# Patient Record
Sex: MALE | Race: WHITE | Employment: OTHER | URBAN - METROPOLITAN AREA
[De-identification: names, ages, dates, MRNs, and addresses within clinical notes are randomized per-mention and may not be internally consistent; named-entity substitution may affect disease eponyms.]

---

## 2017-06-02 PROBLEM — C44.91 SKIN CANCER, BASAL CELL: Status: ACTIVE | Noted: 2017-06-02

## 2017-06-15 ENCOUNTER — HOSPITAL ENCOUNTER (OUTPATIENT)
Dept: CT IMAGING | Age: 77
Discharge: HOME | End: 2017-06-15
Attending: FAMILY MEDICINE
Payer: MEDICARE

## 2017-06-15 DIAGNOSIS — R91.1 PULMONARY NODULE, RIGHT: ICD-10-CM

## 2017-06-15 PROCEDURE — 71250 CT THORAX DX C-: CPT

## 2019-11-11 ENCOUNTER — HOSPITAL ENCOUNTER (OUTPATIENT)
Dept: CT IMAGING | Age: 79
Discharge: HOME | End: 2019-11-11
Attending: UROLOGY
Payer: MEDICARE

## 2019-11-11 DIAGNOSIS — N20.0 NEPHROLITHIASIS: ICD-10-CM

## 2019-11-11 PROCEDURE — 74176 CT ABD & PELVIS W/O CONTRAST: CPT

## 2019-11-11 NOTE — PROGRESS NOTES
Let him know CT reveals a large bladder stone (new) and massive prostate (already known). He does not need scheduled cystoscopy to discuss plan. Get him in to see me to discuss CT results face to face within 2-3 weeks.

## 2019-12-27 ENCOUNTER — HOSPITAL ENCOUNTER (OUTPATIENT)
Dept: SURGERY | Age: 79
Discharge: HOME | End: 2019-12-27
Payer: MEDICARE

## 2019-12-27 VITALS
HEART RATE: 66 BPM | BODY MASS INDEX: 38.48 KG/M2 | RESPIRATION RATE: 16 BRPM | OXYGEN SATURATION: 96 % | SYSTOLIC BLOOD PRESSURE: 144 MMHG | DIASTOLIC BLOOD PRESSURE: 83 MMHG | TEMPERATURE: 98 F | HEIGHT: 60 IN | WEIGHT: 196 LBS

## 2019-12-27 LAB
ANION GAP SERPL CALC-SCNC: 4 MMOL/L (ref 7–16)
BUN SERPL-MCNC: 22 MG/DL (ref 8–23)
CALCIUM SERPL-MCNC: 8.7 MG/DL (ref 8.3–10.4)
CHLORIDE SERPL-SCNC: 113 MMOL/L (ref 98–107)
CO2 SERPL-SCNC: 26 MMOL/L (ref 21–32)
CREAT SERPL-MCNC: 1.21 MG/DL (ref 0.8–1.5)
ERYTHROCYTE [DISTWIDTH] IN BLOOD BY AUTOMATED COUNT: 13.7 % (ref 11.9–14.6)
GLUCOSE SERPL-MCNC: 95 MG/DL (ref 65–100)
HCT VFR BLD AUTO: 40.9 % (ref 41.1–50.3)
HGB BLD-MCNC: 14.3 G/DL (ref 13.6–17.2)
MCH RBC QN AUTO: 31.8 PG (ref 26.1–32.9)
MCHC RBC AUTO-ENTMCNC: 35 G/DL (ref 31.4–35)
MCV RBC AUTO: 90.9 FL (ref 79.6–97.8)
NRBC # BLD: 0 K/UL (ref 0–0.2)
PLATELET # BLD AUTO: 185 K/UL (ref 150–450)
PMV BLD AUTO: 8.9 FL (ref 9.4–12.3)
POTASSIUM SERPL-SCNC: 3.7 MMOL/L (ref 3.5–5.1)
RBC # BLD AUTO: 4.5 M/UL (ref 4.23–5.6)
SODIUM SERPL-SCNC: 143 MMOL/L (ref 136–145)
WBC # BLD AUTO: 4.8 K/UL (ref 4.3–11.1)

## 2019-12-27 PROCEDURE — 93005 ELECTROCARDIOGRAM TRACING: CPT | Performed by: ANESTHESIOLOGY

## 2019-12-27 PROCEDURE — 85027 COMPLETE CBC AUTOMATED: CPT

## 2019-12-27 PROCEDURE — 80048 BASIC METABOLIC PNL TOTAL CA: CPT

## 2019-12-27 RX ORDER — TAMSULOSIN HYDROCHLORIDE 0.4 MG/1
0.4 CAPSULE ORAL
COMMUNITY
End: 2020-03-27

## 2019-12-27 RX ORDER — BISMUTH SUBSALICYLATE 262 MG
1 TABLET,CHEWABLE ORAL DAILY
COMMUNITY

## 2019-12-27 RX ORDER — SODIUM CHLORIDE 9 MG/ML
250 INJECTION, SOLUTION INTRAVENOUS AS NEEDED
Status: CANCELLED | OUTPATIENT
Start: 2019-12-27

## 2019-12-27 NOTE — H&P (VIEW-ONLY)
[facility]  [address]  [place], [address]  [phone]    Jody Boateng  : 1940    Chief Complaint   Patient presents with    Pre-op Exam          HPI     Jody Boateng is a 78 y.o. male followed secondary to an elevated PSA, BPH, and + family history of prostate cancer (father and identical twin brother). He was previously followed by Dr. Cristiano Morales. He is taking avodart since . He underwent prostate biopsy  secondary to psa of 3.4. 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 Repeat prostate biopsy  revealed all benign cores.       LUTS have improved on avodart. In regards to ED, he uses 100mg viagra with good results.       He had an episode of gross hematuria. 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 CT 14 revealed pelvic mass to be a VERY LARGE prostate (180 gr by estimation) with indentation into the bladder. Cystoscopy  revealed the enlarged prostate and no bladder pathology.      He returned in  with 6 month history of gross hematuria 2-3 times per week.  It could be heavy at times.   LUTS are well controlled on his avodart.  He stopped 81 mg asa in  and gross hematuria has decreased substantially.  We discussed repeating cystoscopy and he wanted to hold off for now since we have done that as recently as '15. Jenifer Richardson understands a very low risk of missing a bladder cancer, although bleeding is much more likely from a prostatic source.      He is continuing to have gross hematuria, light, with exercise.  He has also passed a few stones and had 2 UTI's in the last year.       CT 19 revealed a 2.7 cm bladder stone and massive prostate.       PSA:  4.05,  1.69, 2/10 1.6, 9/10 1.34, 3/11 1.55,  1.71, 3/12 1.52, 3/13 2.24,  0.8,  0.8,  4.63, 3/14 2.11,  1.6, 9/15 1.9, 10/16 2.0          Past Medical History:   Diagnosis Date    Arthritis     BPH (benign prostatic hyperplasia)     Calculus of ureter     Elevated prostate specific antigen (PSA)     Family history of malignant neoplasm of prostate     Hematospermia     Hematuria, microscopic     Hypertension     Hypertrophy of prostate without urinary obstruction and other lower urinary tract symptoms (LUTS)     Ill-defined condition     unknown blood disorder as a child    Impotence of organic origin     Renal colic      Past Surgical History:   Procedure Laterality Date    BIOPSY PROSTATE      HX COLONOSCOPY  2016    + polyp    HX HERNIA REPAIR  AS CHILD     Current Outpatient Medications   Medication Sig Dispense Refill    tamsulosin (FLOMAX) 0.4 mg capsule Take 0.4 mg by mouth nightly.  multivitamin (ONE A DAY) tablet Take 1 Tab by mouth daily.  dutasteride (AVODART) 0.5 mg capsule TAKE 1 CAPSULE BY MOUTH  DAILY 90 Cap 3    losartan (COZAAR) 100 mg tablet Take 100 mg by mouth.  sildenafil citrate (VIAGRA) 100 mg tablet Take 1 Tab by mouth as needed (ed).  4 Tab 2    amLODIPine (NORVASC) 5 mg tablet 1 every day for BP 90 Tab 12     Allergies   Allergen Reactions    Cefdinir Diarrhea    Sulfa (Sulfonamide Antibiotics) Unknown (comments)     Social History     Socioeconomic History    Marital status:      Spouse name: Not on file    Number of children: Not on file    Years of education: Not on file    Highest education level: Not on file   Occupational History    Not on file   Social Needs    Financial resource strain: Not on file    Food insecurity:     Worry: Not on file     Inability: Not on file    Transportation needs:     Medical: Not on file     Non-medical: Not on file   Tobacco Use    Smoking status: Never Smoker    Smokeless tobacco: Never Used   Substance and Sexual Activity    Alcohol use: No    Drug use: No    Sexual activity: Not on file   Lifestyle    Physical activity:     Days per week: Not on file     Minutes per session: Not on file    Stress: Not on file   Relationships    Social connections:     Talks on phone: Not on file     Gets together: Not on file     Attends Anabaptist service: Not on file     Active member of club or organization: Not on file     Attends meetings of clubs or organizations: Not on file     Relationship status: Not on file    Intimate partner violence:     Fear of current or ex partner: Not on file     Emotionally abused: Not on file     Physically abused: Not on file     Forced sexual activity: Not on file   Other Topics Concern    Not on file   Social History Narrative    Not on file     Family History   Problem Relation Age of Onset    Cancer Father         prostate    Other Sister         LUPUS    Cancer Brother         PROSTATE    Cancer Other        Review of Systems  Constitutional: Negative  Skin: Negative skin ROS  Cardiovascular: Positive for hypertension. Genitourinary: Positive for hematuria and frequent urination. Musculoskeletal: Positive for back pain.   Neurological: Neg neuro ROS      Urinalysis  UA - Dipstick  Results for orders placed or performed in visit on 11/04/19   AMB POC URINALYSIS DIP STICK AUTO W/ MICRO (PGU)     Status: None   Result Value Ref Range Status    Glucose (UA POC) Negative Negative mg/dL Final    Bilirubin (UA POC) Negative Negative Final    Ketones (UA POC) Negative Negative Final    Specific gravity (UA POC) 1.025 1.001 - 1.035 Final    Blood (UA POC) Large Negative Final    pH (UA POC) 5.5 4.6 - 8.0 Final    Protein (UA POC) Trace Negative Final    Urobilinogen (POC) 0.2 mg/dL  Final    Nitrites (UA POC) Negative Negative Final    Leukocyte esterase (UA POC) Negative Negative Final       Visit Vitals  BP (!) 134/91 (BP 1 Location: Right arm, BP Patient Position: Sitting)   Pulse 66   Temp 97.1 °F (36.2 °C) (Oral)   Ht 5' 9\" (1.753 m)   Wt 195 lb (88.5 kg)   BMI 28.80 kg/m²        GENERAL: NAD, ALERT, A&O x 3, GAIT NORMAL  CARDIAC: regular rate and rhythm  CHEST AND LUNG: Easy work of breathing  ABDOMEN: soft, non tender, non distended  NEUROLOGIC: cranial nerves 2-12 grossly intact           Assessment and Plan    ICD-10-CM ICD-9-CM    1. BPH with obstruction/lower urinary tract symptoms N40.1 600.01     N13.8 599.69    2. Bladder stone N21.0 594.1          He is scheduled for open simple prostatectomy and bladder stone removal.  We discussed risks of this procedure, including need for blood transfusion. Surgery is scheduled for 1/3/20.

## 2019-12-27 NOTE — PERIOP NOTES
Patient confirms name and . Order to obtain consent not found in EHR and  matches case posting. Type 3 surgery,  assessment complete. Labs per surgeon: cbc, bmp 19, type and cross 2 units 2020 for surgery on 2020. Labs per anesthesia protocol: none  [id number]      Medication list Rx bottles visualized today. Hibiclens and instructions given per hospital policy. Patient provided with and instructed on educational handouts including Guide to Surgery, Pain Management, Hand Hygiene, Blood Transfusion Education, and [facility] Brochure. Patient answered medical/surgical history questions at their best of ability. All prior to admission medications documented in [facility]. Patient instructed to continue previous medications as prescribed prior to surgery and to take the following medications the day of surgery according to anesthesia guidelines with a small sip of water: amlodipine, avodart. Patient instructed to hold all vitamins 7 days prior to surgery and NSAIDS 5 days prior to surgery, patient verbalized understanding. Medications to be held: none    Patient instructed on the following:  Arrive at [facility], time of arrival to be called the day before by 1700  NPO after midnight including gum, mints, and ice chips. Responsible adult must drive patient to the hospital, stay during surgery, and patient will require supervision 24 hours after anesthesia. Use hibiclens in shower the night before surgery and on the morning of surgery. Leave all valuables (money and jewelry) at home but bring insurance card and ID on       DOS. Do not wear make-up, nail polish, lotions, cologne, perfumes, powders, or oil on skin. Patient teach back successful and patient demonstrates knowledge of instruction.

## 2019-12-27 NOTE — PERIOP NOTES
PLEASE CONTINUE TAKING ALL PRESCRIPTION MEDICATIONS UP TO THE DAY OF SURGERY UNLESS OTHERWISE DIRECTED BELOW. DISCONTINUE all vitamins and supplements 7days prior to surgery. DISCONTINUE Non-Steriodal Anti-Inflammatory (NSAIDS) such as Advil and Aleve 5 days prior to surgery. Home Medications to take  the day of surgery    amlodipine  Dutasteride             Home Medications   to Hold   nsaids- 5 days        Comments   Please return to outpatient entrance and go to desk for lab draw on Thursday 01/02/2020. Do not remove green armband. Please do not bring home medications with you on the day of surgery unless otherwise directed by your nurse. If you are instructed to bring home medications, please give them to your nurse as they will be administered by the nursing staff. If you have any questions, please call [facility] [phone] or [facility] [phone].

## 2019-12-28 LAB
ATRIAL RATE: 62 BPM
CALCULATED P AXIS, ECG09: 4 DEGREES
CALCULATED R AXIS, ECG10: 53 DEGREES
CALCULATED T AXIS, ECG11: 47 DEGREES
DIAGNOSIS, 93000: NORMAL
P-R INTERVAL, ECG05: 216 MS
Q-T INTERVAL, ECG07: 464 MS
QRS DURATION, ECG06: 118 MS
QTC CALCULATION (BEZET), ECG08: 470 MS
VENTRICULAR RATE, ECG03: 62 BPM

## 2020-01-02 ENCOUNTER — ANESTHESIA EVENT (OUTPATIENT)
Dept: SURGERY | Age: 80
DRG: 717 | End: 2020-01-02
Payer: MEDICARE

## 2020-01-02 ENCOUNTER — HOSPITAL ENCOUNTER (OUTPATIENT)
Dept: LAB | Age: 80
Discharge: HOME | End: 2020-01-02
Payer: MEDICARE

## 2020-01-02 PROCEDURE — 86923 COMPATIBILITY TEST ELECTRIC: CPT

## 2020-01-02 PROCEDURE — 86900 BLOOD TYPING SEROLOGIC ABO: CPT

## 2020-01-02 PROCEDURE — 86644 CMV ANTIBODY: CPT

## 2020-01-02 PROCEDURE — 36415 COLL VENOUS BLD VENIPUNCTURE: CPT

## 2020-01-02 NOTE — ANESTHESIA PREPROCEDURE EVALUATION
Relevant Problems   No relevant active problems       Anesthetic History               Review of Systems / Medical History  Patient summary reviewed, nursing notes reviewed and pertinent labs reviewed    Pulmonary                   Neuro/Psych              Cardiovascular    Hypertension: well controlled              Exercise tolerance: >4 METS     GI/Hepatic/Renal         Renal disease: stones       Endo/Other        Arthritis     Other Findings            Physical Exam    Airway  Mallampati: II  TM Distance: > 6 cm  Neck ROM: decreased range of motion   Mouth opening: Normal     Cardiovascular  Regular rate and rhythm,  S1 and S2 normal,  no murmur, click, rub, or gallop             Dental  No notable dental hx       Pulmonary  Breath sounds clear to auscultation               Abdominal  GI exam deferred       Other Findings            Anesthetic Plan    ASA: 2  Anesthesia type: general      Post-op pain plan if not by surgeon: peripheral nerve block single      Anesthetic plan and risks discussed with: Patient and Spouse      Discussed TAP block

## 2020-01-03 ENCOUNTER — APPOINTMENT (OUTPATIENT)
Dept: MRI IMAGING | Age: 80
DRG: 717 | End: 2020-01-03
Attending: PSYCHIATRY & NEUROLOGY
Payer: MEDICARE

## 2020-01-03 ENCOUNTER — HOSPITAL ENCOUNTER (INPATIENT)
Age: 80
LOS: 5 days | Discharge: HOME HEALTH | DRG: 717 | End: 2020-01-08
Attending: UROLOGY | Admitting: UROLOGY
Payer: MEDICARE

## 2020-01-03 ENCOUNTER — APPOINTMENT (OUTPATIENT)
Dept: CT IMAGING | Age: 80
DRG: 717 | End: 2020-01-03
Attending: PSYCHIATRY & NEUROLOGY
Payer: MEDICARE

## 2020-01-03 ENCOUNTER — ANESTHESIA (OUTPATIENT)
Dept: SURGERY | Age: 80
DRG: 717 | End: 2020-01-03
Payer: MEDICARE

## 2020-01-03 DIAGNOSIS — I63.9 ACUTE ISCHEMIC STROKE (HCC): ICD-10-CM

## 2020-01-03 DIAGNOSIS — Z98.890 S/P UROLOGICAL SURGERY: Primary | ICD-10-CM

## 2020-01-03 PROBLEM — N40.1 BPH WITH OBSTRUCTION/LOWER URINARY TRACT SYMPTOMS: Status: ACTIVE | Noted: 2020-01-03

## 2020-01-03 PROBLEM — N21.0 BLADDER STONE: Status: ACTIVE | Noted: 2020-01-03

## 2020-01-03 PROBLEM — N13.8 BPH WITH OBSTRUCTION/LOWER URINARY TRACT SYMPTOMS: Status: ACTIVE | Noted: 2020-01-03

## 2020-01-03 LAB
ANION GAP SERPL CALC-SCNC: 5 MMOL/L (ref 7–16)
BUN SERPL-MCNC: 20 MG/DL (ref 8–23)
CALCIUM SERPL-MCNC: 7.7 MG/DL (ref 8.3–10.4)
CHLORIDE SERPL-SCNC: 110 MMOL/L (ref 98–107)
CO2 SERPL-SCNC: 26 MMOL/L (ref 21–32)
CREAT SERPL-MCNC: 1.4 MG/DL (ref 0.8–1.5)
ERYTHROCYTE [DISTWIDTH] IN BLOOD BY AUTOMATED COUNT: 13.5 % (ref 11.9–14.6)
GLUCOSE SERPL-MCNC: 178 MG/DL (ref 65–100)
HCT VFR BLD AUTO: 32.6 % (ref 41.1–50.3)
HGB BLD-MCNC: 11.3 G/DL (ref 13.6–17.2)
MCH RBC QN AUTO: 31.5 PG (ref 26.1–32.9)
MCHC RBC AUTO-ENTMCNC: 34.7 G/DL (ref 31.4–35)
MCV RBC AUTO: 90.8 FL (ref 79.6–97.8)
NRBC # BLD: 0 K/UL (ref 0–0.2)
PLATELET # BLD AUTO: 158 K/UL (ref 150–450)
PMV BLD AUTO: 8.5 FL (ref 9.4–12.3)
POTASSIUM SERPL-SCNC: 3.4 MMOL/L (ref 3.5–5.1)
RBC # BLD AUTO: 3.59 M/UL (ref 4.23–5.6)
SODIUM SERPL-SCNC: 141 MMOL/L (ref 136–145)
WBC # BLD AUTO: 12.8 K/UL (ref 4.3–11.1)

## 2020-01-03 PROCEDURE — 65660000000 HC RM CCU STEPDOWN

## 2020-01-03 PROCEDURE — 76060000038 HC ANESTHESIA 3.5 TO 4 HR: Performed by: UROLOGY

## 2020-01-03 PROCEDURE — 77030014008 HC SPNG HEMSTAT J&J -C: Performed by: UROLOGY

## 2020-01-03 PROCEDURE — 77030040922 HC BLNKT HYPOTHRM STRY -A: Performed by: ANESTHESIOLOGY

## 2020-01-03 PROCEDURE — 80048 BASIC METABOLIC PNL TOTAL CA: CPT

## 2020-01-03 PROCEDURE — 70496 CT ANGIOGRAPHY HEAD: CPT

## 2020-01-03 PROCEDURE — 77030039425 HC BLD LARYNG TRULITE DISP TELE -A: Performed by: ANESTHESIOLOGY

## 2020-01-03 PROCEDURE — 77030002934 HC SUT MCRYL J&J -B: Performed by: UROLOGY

## 2020-01-03 PROCEDURE — 76010000173 HC OR TIME 3 TO 3.5 HR INTENSV-TIER 1: Performed by: UROLOGY

## 2020-01-03 PROCEDURE — 77030002888 HC SUT CHRMC J&J -A: Performed by: UROLOGY

## 2020-01-03 PROCEDURE — 77030031139 HC SUT VCRL2 J&J -A: Performed by: UROLOGY

## 2020-01-03 PROCEDURE — 74011000250 HC RX REV CODE- 250: Performed by: ANESTHESIOLOGY

## 2020-01-03 PROCEDURE — 77030029359 HC PRB ESOPH TEMP CATH ANTM -F: Performed by: ANESTHESIOLOGY

## 2020-01-03 PROCEDURE — 77030008462 HC STPLR SKN PROX J&J -A: Performed by: UROLOGY

## 2020-01-03 PROCEDURE — P9016 RBC LEUKOCYTES REDUCED: HCPCS

## 2020-01-03 PROCEDURE — 70551 MRI BRAIN STEM W/O DYE: CPT

## 2020-01-03 PROCEDURE — 0TCB0ZZ EXTIRPATION OF MATTER FROM BLADDER, OPEN APPROACH: ICD-10-PCS | Performed by: UROLOGY

## 2020-01-03 PROCEDURE — 77030005206: Performed by: UROLOGY

## 2020-01-03 PROCEDURE — 88305 TISSUE EXAM BY PATHOLOGIST: CPT

## 2020-01-03 PROCEDURE — 0VB00ZZ EXCISION OF PROSTATE, OPEN APPROACH: ICD-10-PCS | Performed by: UROLOGY

## 2020-01-03 PROCEDURE — 77030019927 HC TBNG IRR CYSTO BAXT -A: Performed by: UROLOGY

## 2020-01-03 PROCEDURE — 85027 COMPLETE CBC AUTOMATED: CPT

## 2020-01-03 PROCEDURE — 77030018836 HC SOL IRR NACL ICUM -A: Performed by: UROLOGY

## 2020-01-03 PROCEDURE — 77030011264 HC ELECTRD BLD EXT COVD -A: Performed by: UROLOGY

## 2020-01-03 PROCEDURE — 74011250636 HC RX REV CODE- 250/636: Performed by: UROLOGY

## 2020-01-03 PROCEDURE — 77030019908 HC STETH ESOPH SIMS -A: Performed by: ANESTHESIOLOGY

## 2020-01-03 PROCEDURE — 74011000258 HC RX REV CODE- 258: Performed by: UROLOGY

## 2020-01-03 PROCEDURE — 74011636320 HC RX REV CODE- 636/320: Performed by: UROLOGY

## 2020-01-03 PROCEDURE — 74011250636 HC RX REV CODE- 250/636: Performed by: ANESTHESIOLOGY

## 2020-01-03 PROCEDURE — 77030010545: Performed by: UROLOGY

## 2020-01-03 PROCEDURE — 77030002966 HC SUT PDS J&J -A: Performed by: UROLOGY

## 2020-01-03 PROCEDURE — 88300 SURGICAL PATH GROSS: CPT

## 2020-01-03 PROCEDURE — 82962 GLUCOSE BLOOD TEST: CPT

## 2020-01-03 PROCEDURE — 99291 CRITICAL CARE FIRST HOUR: CPT | Performed by: PSYCHIATRY & NEUROLOGY

## 2020-01-03 PROCEDURE — 74011000250 HC RX REV CODE- 250: Performed by: NURSE ANESTHETIST, CERTIFIED REGISTERED

## 2020-01-03 PROCEDURE — 36415 COLL VENOUS BLD VENIPUNCTURE: CPT

## 2020-01-03 PROCEDURE — 74011250637 HC RX REV CODE- 250/637: Performed by: UROLOGY

## 2020-01-03 PROCEDURE — 77030037088 HC TUBE ENDOTRACH ORAL NSL COVD-A: Performed by: ANESTHESIOLOGY

## 2020-01-03 PROCEDURE — 77030034155 HC SHR COAG HARM ACE J&J -E: Performed by: UROLOGY

## 2020-01-03 PROCEDURE — 74011000250 HC RX REV CODE- 250: Performed by: UROLOGY

## 2020-01-03 PROCEDURE — 70450 CT HEAD/BRAIN W/O DYE: CPT

## 2020-01-03 PROCEDURE — 74011250636 HC RX REV CODE- 250/636: Performed by: NURSE ANESTHETIST, CERTIFIED REGISTERED

## 2020-01-03 PROCEDURE — 77030018846 HC SOL IRR STRL H20 ICUM -A: Performed by: UROLOGY

## 2020-01-03 PROCEDURE — 0042T CT PERF W CBF: CPT

## 2020-01-03 PROCEDURE — 30233N1 TRANSFUSION OF NONAUTOLOGOUS RED BLOOD CELLS INTO PERIPHERAL VEIN, PERCUTANEOUS APPROACH: ICD-10-PCS | Performed by: UROLOGY

## 2020-01-03 PROCEDURE — 77030040361 HC SLV COMPR DVT MDII -B: Performed by: UROLOGY

## 2020-01-03 PROCEDURE — 76210000016 HC OR PH I REC 1 TO 1.5 HR: Performed by: UROLOGY

## 2020-01-03 PROCEDURE — 77030034696 HC CATH URETH FOL 2W BARD -A: Performed by: UROLOGY

## 2020-01-03 PROCEDURE — P9045 ALBUMIN (HUMAN), 5%, 250 ML: HCPCS | Performed by: NURSE ANESTHETIST, CERTIFIED REGISTERED

## 2020-01-03 PROCEDURE — 77030018836 HC SOL IRR NACL ICUM -A

## 2020-01-03 RX ORDER — BUPIVACAINE HYDROCHLORIDE AND EPINEPHRINE 5; 5 MG/ML; UG/ML
INJECTION, SOLUTION EPIDURAL; INTRACAUDAL; PERINEURAL AS NEEDED
Status: DISCONTINUED | OUTPATIENT
Start: 2020-01-03 | End: 2020-01-03 | Stop reason: HOSPADM

## 2020-01-03 RX ORDER — BUPIVACAINE HYDROCHLORIDE AND EPINEPHRINE 2.5; 5 MG/ML; UG/ML
INJECTION, SOLUTION EPIDURAL; INFILTRATION; INTRACAUDAL; PERINEURAL AS NEEDED
Status: DISCONTINUED | OUTPATIENT
Start: 2020-01-03 | End: 2020-01-03 | Stop reason: HOSPADM

## 2020-01-03 RX ORDER — OXYCODONE HYDROCHLORIDE 5 MG/1
5 TABLET ORAL
Status: DISCONTINUED | OUTPATIENT
Start: 2020-01-03 | End: 2020-01-03 | Stop reason: HOSPADM

## 2020-01-03 RX ORDER — ACETAMINOPHEN 10 MG/ML
INJECTION, SOLUTION INTRAVENOUS AS NEEDED
Status: DISCONTINUED | OUTPATIENT
Start: 2020-01-03 | End: 2020-01-03 | Stop reason: HOSPADM

## 2020-01-03 RX ORDER — DOCUSATE SODIUM 100 MG/1
100 CAPSULE, LIQUID FILLED ORAL 2 TIMES DAILY
Status: DISCONTINUED | OUTPATIENT
Start: 2020-01-03 | End: 2020-01-08 | Stop reason: HOSPADM

## 2020-01-03 RX ORDER — ONDANSETRON 2 MG/ML
INJECTION INTRAMUSCULAR; INTRAVENOUS AS NEEDED
Status: DISCONTINUED | OUTPATIENT
Start: 2020-01-03 | End: 2020-01-03 | Stop reason: HOSPADM

## 2020-01-03 RX ORDER — PROPOFOL 10 MG/ML
INJECTION, EMULSION INTRAVENOUS AS NEEDED
Status: DISCONTINUED | OUTPATIENT
Start: 2020-01-03 | End: 2020-01-03 | Stop reason: HOSPADM

## 2020-01-03 RX ORDER — DEXTROSE MONOHYDRATE AND SODIUM CHLORIDE 5; .45 G/100ML; G/100ML
125 INJECTION, SOLUTION INTRAVENOUS CONTINUOUS
Status: DISCONTINUED | OUTPATIENT
Start: 2020-01-03 | End: 2020-01-04

## 2020-01-03 RX ORDER — NEOSTIGMINE METHYLSULFATE 1 MG/ML
INJECTION, SOLUTION INTRAVENOUS AS NEEDED
Status: DISCONTINUED | OUTPATIENT
Start: 2020-01-03 | End: 2020-01-03 | Stop reason: HOSPADM

## 2020-01-03 RX ORDER — VECURONIUM BROMIDE FOR INJECTION 1 MG/ML
INJECTION, POWDER, LYOPHILIZED, FOR SOLUTION INTRAVENOUS AS NEEDED
Status: DISCONTINUED | OUTPATIENT
Start: 2020-01-03 | End: 2020-01-03 | Stop reason: HOSPADM

## 2020-01-03 RX ORDER — FENTANYL CITRATE 50 UG/ML
INJECTION, SOLUTION INTRAMUSCULAR; INTRAVENOUS AS NEEDED
Status: DISCONTINUED | OUTPATIENT
Start: 2020-01-03 | End: 2020-01-03 | Stop reason: HOSPADM

## 2020-01-03 RX ORDER — SODIUM CHLORIDE 9 MG/ML
250 INJECTION, SOLUTION INTRAVENOUS AS NEEDED
Status: DISCONTINUED | OUTPATIENT
Start: 2020-01-03 | End: 2020-01-03

## 2020-01-03 RX ORDER — SODIUM CHLORIDE 0.9 % (FLUSH) 0.9 %
5-40 SYRINGE (ML) INJECTION EVERY 8 HOURS
Status: DISCONTINUED | OUTPATIENT
Start: 2020-01-03 | End: 2020-01-08 | Stop reason: HOSPADM

## 2020-01-03 RX ORDER — ACETAMINOPHEN 325 MG/1
650 TABLET ORAL
Status: DISCONTINUED | OUTPATIENT
Start: 2020-01-03 | End: 2020-01-08 | Stop reason: HOSPADM

## 2020-01-03 RX ORDER — SODIUM CHLORIDE, SODIUM LACTATE, POTASSIUM CHLORIDE, CALCIUM CHLORIDE 600; 310; 30; 20 MG/100ML; MG/100ML; MG/100ML; MG/100ML
125 INJECTION, SOLUTION INTRAVENOUS CONTINUOUS
Status: DISCONTINUED | OUTPATIENT
Start: 2020-01-03 | End: 2020-01-03 | Stop reason: HOSPADM

## 2020-01-03 RX ORDER — GLYCOPYRROLATE 0.2 MG/ML
INJECTION INTRAMUSCULAR; INTRAVENOUS AS NEEDED
Status: DISCONTINUED | OUTPATIENT
Start: 2020-01-03 | End: 2020-01-03 | Stop reason: HOSPADM

## 2020-01-03 RX ORDER — SODIUM CHLORIDE 0.9 % (FLUSH) 0.9 %
5-40 SYRINGE (ML) INJECTION AS NEEDED
Status: DISCONTINUED | OUTPATIENT
Start: 2020-01-03 | End: 2020-01-08 | Stop reason: HOSPADM

## 2020-01-03 RX ORDER — KETAMINE HYDROCHLORIDE 50 MG/ML
INJECTION, SOLUTION INTRAMUSCULAR; INTRAVENOUS AS NEEDED
Status: DISCONTINUED | OUTPATIENT
Start: 2020-01-03 | End: 2020-01-03 | Stop reason: HOSPADM

## 2020-01-03 RX ORDER — NALOXONE HYDROCHLORIDE 0.4 MG/ML
0.1 INJECTION, SOLUTION INTRAMUSCULAR; INTRAVENOUS; SUBCUTANEOUS AS NEEDED
Status: DISCONTINUED | OUTPATIENT
Start: 2020-01-03 | End: 2020-01-03 | Stop reason: HOSPADM

## 2020-01-03 RX ORDER — SODIUM CHLORIDE 9 MG/ML
INJECTION, SOLUTION INTRAVENOUS
Status: DISCONTINUED | OUTPATIENT
Start: 2020-01-03 | End: 2020-01-03 | Stop reason: HOSPADM

## 2020-01-03 RX ORDER — DEXAMETHASONE SODIUM PHOSPHATE 4 MG/ML
INJECTION, SOLUTION INTRA-ARTICULAR; INTRALESIONAL; INTRAMUSCULAR; INTRAVENOUS; SOFT TISSUE AS NEEDED
Status: DISCONTINUED | OUTPATIENT
Start: 2020-01-03 | End: 2020-01-03 | Stop reason: HOSPADM

## 2020-01-03 RX ORDER — ROCURONIUM BROMIDE 10 MG/ML
INJECTION, SOLUTION INTRAVENOUS AS NEEDED
Status: DISCONTINUED | OUTPATIENT
Start: 2020-01-03 | End: 2020-01-03 | Stop reason: HOSPADM

## 2020-01-03 RX ORDER — HYDROCODONE BITARTRATE AND ACETAMINOPHEN 5; 325 MG/1; MG/1
1 TABLET ORAL
Status: DISCONTINUED | OUTPATIENT
Start: 2020-01-03 | End: 2020-01-08 | Stop reason: HOSPADM

## 2020-01-03 RX ORDER — CIPROFLOXACIN 2 MG/ML
400 INJECTION, SOLUTION INTRAVENOUS
Status: COMPLETED | OUTPATIENT
Start: 2020-01-03 | End: 2020-01-03

## 2020-01-03 RX ORDER — HYDROMORPHONE HYDROCHLORIDE 1 MG/ML
1 INJECTION, SOLUTION INTRAMUSCULAR; INTRAVENOUS; SUBCUTANEOUS
Status: DISCONTINUED | OUTPATIENT
Start: 2020-01-03 | End: 2020-01-08 | Stop reason: HOSPADM

## 2020-01-03 RX ORDER — SODIUM CHLORIDE, SODIUM LACTATE, POTASSIUM CHLORIDE, CALCIUM CHLORIDE 600; 310; 30; 20 MG/100ML; MG/100ML; MG/100ML; MG/100ML
INJECTION, SOLUTION INTRAVENOUS
Status: DISCONTINUED | OUTPATIENT
Start: 2020-01-03 | End: 2020-01-03 | Stop reason: HOSPADM

## 2020-01-03 RX ORDER — SODIUM CHLORIDE 0.9 % (FLUSH) 0.9 %
10 SYRINGE (ML) INJECTION
Status: COMPLETED | OUTPATIENT
Start: 2020-01-03 | End: 2020-01-03

## 2020-01-03 RX ORDER — OXYBUTYNIN CHLORIDE 5 MG/1
5 TABLET ORAL
Status: DISCONTINUED | OUTPATIENT
Start: 2020-01-03 | End: 2020-01-08 | Stop reason: HOSPADM

## 2020-01-03 RX ORDER — EPHEDRINE SULFATE/0.9% NACL/PF 50 MG/5 ML
SYRINGE (ML) INTRAVENOUS AS NEEDED
Status: DISCONTINUED | OUTPATIENT
Start: 2020-01-03 | End: 2020-01-03 | Stop reason: HOSPADM

## 2020-01-03 RX ORDER — DUTASTERIDE 0.5 MG/1
0.5 CAPSULE, LIQUID FILLED ORAL DAILY
Status: DISCONTINUED | OUTPATIENT
Start: 2020-01-04 | End: 2020-01-08 | Stop reason: HOSPADM

## 2020-01-03 RX ORDER — SODIUM CHLORIDE 9 MG/ML
250 INJECTION, SOLUTION INTRAVENOUS AS NEEDED
Status: DISCONTINUED | OUTPATIENT
Start: 2020-01-03 | End: 2020-01-03 | Stop reason: HOSPADM

## 2020-01-03 RX ORDER — LIDOCAINE HYDROCHLORIDE 20 MG/ML
INJECTION, SOLUTION EPIDURAL; INFILTRATION; INTRACAUDAL; PERINEURAL AS NEEDED
Status: DISCONTINUED | OUTPATIENT
Start: 2020-01-03 | End: 2020-01-03 | Stop reason: HOSPADM

## 2020-01-03 RX ORDER — LIDOCAINE HYDROCHLORIDE 10 MG/ML
0.1 INJECTION INFILTRATION; PERINEURAL AS NEEDED
Status: DISCONTINUED | OUTPATIENT
Start: 2020-01-03 | End: 2020-01-03 | Stop reason: HOSPADM

## 2020-01-03 RX ORDER — HYDROMORPHONE HYDROCHLORIDE 2 MG/ML
0.5 INJECTION, SOLUTION INTRAMUSCULAR; INTRAVENOUS; SUBCUTANEOUS
Status: DISCONTINUED | OUTPATIENT
Start: 2020-01-03 | End: 2020-01-03 | Stop reason: HOSPADM

## 2020-01-03 RX ORDER — ONDANSETRON 2 MG/ML
4 INJECTION INTRAMUSCULAR; INTRAVENOUS
Status: DISCONTINUED | OUTPATIENT
Start: 2020-01-03 | End: 2020-01-08 | Stop reason: HOSPADM

## 2020-01-03 RX ORDER — FUROSEMIDE 10 MG/ML
INJECTION INTRAMUSCULAR; INTRAVENOUS AS NEEDED
Status: DISCONTINUED | OUTPATIENT
Start: 2020-01-03 | End: 2020-01-03 | Stop reason: HOSPADM

## 2020-01-03 RX ORDER — ALBUMIN HUMAN 50 G/1000ML
SOLUTION INTRAVENOUS AS NEEDED
Status: DISCONTINUED | OUTPATIENT
Start: 2020-01-03 | End: 2020-01-03 | Stop reason: HOSPADM

## 2020-01-03 RX ADMIN — SODIUM CHLORIDE, SODIUM LACTATE, POTASSIUM CHLORIDE, AND CALCIUM CHLORIDE 125 ML/HR: 600; 310; 30; 20 INJECTION, SOLUTION INTRAVENOUS at 06:53

## 2020-01-03 RX ADMIN — SODIUM CHLORIDE, SODIUM LACTATE, POTASSIUM CHLORIDE, AND CALCIUM CHLORIDE: 600; 310; 30; 20 INJECTION, SOLUTION INTRAVENOUS at 08:20

## 2020-01-03 RX ADMIN — Medication 15 MG: at 08:05

## 2020-01-03 RX ADMIN — ONDANSETRON 4 MG: 2 INJECTION INTRAMUSCULAR; INTRAVENOUS at 10:15

## 2020-01-03 RX ADMIN — VECURONIUM BROMIDE 2 MG: 1 INJECTION, POWDER, LYOPHILIZED, FOR SOLUTION INTRAVENOUS at 09:13

## 2020-01-03 RX ADMIN — FENTANYL CITRATE 50 MCG: 50 INJECTION INTRAMUSCULAR; INTRAVENOUS at 08:37

## 2020-01-03 RX ADMIN — FENTANYL CITRATE 100 MCG: 50 INJECTION INTRAMUSCULAR; INTRAVENOUS at 07:57

## 2020-01-03 RX ADMIN — KETAMINE HYDROCHLORIDE 30 MG: 50 INJECTION INTRAMUSCULAR; INTRAVENOUS at 08:59

## 2020-01-03 RX ADMIN — BUPIVACAINE HYDROCHLORIDE AND EPINEPHRINE BITARTRATE 35 ML: 2.5; .005 INJECTION, SOLUTION EPIDURAL; INFILTRATION; INTRACAUDAL; PERINEURAL at 11:02

## 2020-01-03 RX ADMIN — SODIUM CHLORIDE: 900 INJECTION, SOLUTION INTRAVENOUS at 10:15

## 2020-01-03 RX ADMIN — CIPROFLOXACIN 400 MG: 2 INJECTION, SOLUTION INTRAVENOUS at 08:00

## 2020-01-03 RX ADMIN — VECURONIUM BROMIDE 2 MG: 1 INJECTION, POWDER, LYOPHILIZED, FOR SOLUTION INTRAVENOUS at 09:41

## 2020-01-03 RX ADMIN — ALBUMIN (HUMAN) 250 ML: 12.5 INJECTION, SOLUTION INTRAVENOUS at 09:36

## 2020-01-03 RX ADMIN — FENTANYL CITRATE 50 MCG: 50 INJECTION INTRAMUSCULAR; INTRAVENOUS at 11:16

## 2020-01-03 RX ADMIN — ROCURONIUM BROMIDE 50 MG: 10 INJECTION, SOLUTION INTRAVENOUS at 07:57

## 2020-01-03 RX ADMIN — HYDROCODONE BITARTRATE AND ACETAMINOPHEN 1 TABLET: 5; 325 TABLET ORAL at 19:59

## 2020-01-03 RX ADMIN — PHENYLEPHRINE HYDROCHLORIDE 120 MCG: 10 INJECTION INTRAVENOUS at 09:11

## 2020-01-03 RX ADMIN — Medication 10 ML: at 14:51

## 2020-01-03 RX ADMIN — Medication 15 MG: at 09:10

## 2020-01-03 RX ADMIN — FUROSEMIDE 20 MG: 10 INJECTION, SOLUTION INTRAMUSCULAR; INTRAVENOUS at 10:15

## 2020-01-03 RX ADMIN — SODIUM CHLORIDE, SODIUM LACTATE, POTASSIUM CHLORIDE, AND CALCIUM CHLORIDE: 600; 310; 30; 20 INJECTION, SOLUTION INTRAVENOUS at 10:07

## 2020-01-03 RX ADMIN — SODIUM CHLORIDE, SODIUM LACTATE, POTASSIUM CHLORIDE, AND CALCIUM CHLORIDE: 600; 310; 30; 20 INJECTION, SOLUTION INTRAVENOUS at 07:51

## 2020-01-03 RX ADMIN — HYDROMORPHONE HYDROCHLORIDE 1 MG: 1 INJECTION, SOLUTION INTRAMUSCULAR; INTRAVENOUS; SUBCUTANEOUS at 21:17

## 2020-01-03 RX ADMIN — PHENYLEPHRINE HYDROCHLORIDE 20 MCG/MIN: 10 INJECTION INTRAVENOUS at 09:40

## 2020-01-03 RX ADMIN — KETAMINE HYDROCHLORIDE 60 MG: 50 INJECTION INTRAMUSCULAR; INTRAVENOUS at 07:57

## 2020-01-03 RX ADMIN — DEXTROSE MONOHYDRATE AND SODIUM CHLORIDE 125 ML/HR: 5; .45 INJECTION, SOLUTION INTRAVENOUS at 18:00

## 2020-01-03 RX ADMIN — DEXAMETHASONE SODIUM PHOSPHATE 10 MG: 4 INJECTION, SOLUTION INTRAMUSCULAR; INTRAVENOUS at 10:15

## 2020-01-03 RX ADMIN — LIDOCAINE HYDROCHLORIDE 80 MG: 20 INJECTION, SOLUTION EPIDURAL; INFILTRATION; INTRACAUDAL; PERINEURAL at 07:57

## 2020-01-03 RX ADMIN — PHENYLEPHRINE HYDROCHLORIDE 120 MCG: 10 INJECTION INTRAVENOUS at 09:22

## 2020-01-03 RX ADMIN — PHENYLEPHRINE HYDROCHLORIDE 120 MCG: 10 INJECTION INTRAVENOUS at 09:30

## 2020-01-03 RX ADMIN — IOPAMIDOL 100 ML: 755 INJECTION, SOLUTION INTRAVENOUS at 14:51

## 2020-01-03 RX ADMIN — ALBUMIN (HUMAN) 250 ML: 12.5 INJECTION, SOLUTION INTRAVENOUS at 09:27

## 2020-01-03 RX ADMIN — PHENYLEPHRINE HYDROCHLORIDE 240 MCG: 10 INJECTION INTRAVENOUS at 09:36

## 2020-01-03 RX ADMIN — SODIUM CHLORIDE, SODIUM LACTATE, POTASSIUM CHLORIDE, AND CALCIUM CHLORIDE: 600; 310; 30; 20 INJECTION, SOLUTION INTRAVENOUS at 08:36

## 2020-01-03 RX ADMIN — GLYCOPYRROLATE 0.6 MG: 0.2 INJECTION, SOLUTION INTRAMUSCULAR; INTRAVENOUS at 11:06

## 2020-01-03 RX ADMIN — Medication 15 MG: at 08:51

## 2020-01-03 RX ADMIN — KETAMINE HYDROCHLORIDE 30 MG: 50 INJECTION INTRAMUSCULAR; INTRAVENOUS at 10:02

## 2020-01-03 RX ADMIN — Medication 15 MG: at 11:07

## 2020-01-03 RX ADMIN — Medication 4 MG: at 11:06

## 2020-01-03 RX ADMIN — BUPIVACAINE HYDROCHLORIDE AND EPINEPHRINE BITARTRATE 35 ML: 2.5; .005 INJECTION, SOLUTION EPIDURAL; INFILTRATION; INTRACAUDAL; PERINEURAL at 11:06

## 2020-01-03 RX ADMIN — PROPOFOL 150 MG: 10 INJECTION, EMULSION INTRAVENOUS at 07:57

## 2020-01-03 RX ADMIN — Medication 10 MG: at 09:30

## 2020-01-03 RX ADMIN — Medication 15 MG: at 09:22

## 2020-01-03 RX ADMIN — DOCUSATE SODIUM 100 MG: 100 CAPSULE, LIQUID FILLED ORAL at 21:14

## 2020-01-03 RX ADMIN — ACETAMINOPHEN 1000 MG: 10 INJECTION, SOLUTION INTRAVENOUS at 09:49

## 2020-01-03 RX ADMIN — FENTANYL CITRATE 50 MCG: 50 INJECTION INTRAMUSCULAR; INTRAVENOUS at 11:22

## 2020-01-03 RX ADMIN — SODIUM CHLORIDE 100 ML: 900 INJECTION, SOLUTION INTRAVENOUS at 14:51

## 2020-01-03 NOTE — CONSULTS
Consult    Patient: Sabrina Ocampo MRN: [id number]     YOB: 1940  Age: 78 y.o. Sex: male      Subjective:      Sabrina Ocampo is a 78 y.o. male who is being seen for code S. The patient is currently admitted for open prostatectomy  The patient was last known normal at 0 800 at induction of general anesthesia. He was noted to have significant difficulty with speech on recovery from anesthesia 1431. A code S was called by PACU  at [phone]. Neurology arrived to the bedside at 1432. Initial NIHSS was 2 for severe language dysfunction versus severe dysarthria . A CT of the head was obtained and showed no evidence of hemorrhage or large vessel thrombosis or early ischemic change. .     Past Medical History:   Diagnosis Date    Arthritis     BPH (benign prostatic hyperplasia)     Calculus of ureter     Elevated prostate specific antigen (PSA)     Family history of malignant neoplasm of prostate     First degree AV block     Hematospermia     Hematuria, microscopic     Hypertension     Hypertrophy of prostate without urinary obstruction and other lower urinary tract symptoms (LUTS)     Ill-defined condition     unknown blood disorder as a child    Impotence of organic origin     Incomplete right bundle branch block     Renal colic      Past Surgical History:   Procedure Laterality Date    BIOPSY PROSTATE      HX COLONOSCOPY  2016    + polyp    HX HERNIA REPAIR  AS CHILD      Family History   Problem Relation Age of Onset    Cancer Father         prostate    Other Sister         LUPUS    Cancer Brother         PROSTATE    Cancer Other      Social History     Tobacco Use    Smoking status: Never Smoker    Smokeless tobacco: Never Used   Substance Use Topics    Alcohol use: No      Current Facility-Administered Medications   Medication Dose Route Frequency Provider Last Rate Last Dose    lactated Ringers infusion  125 mL/hr IntraVENous CONTINUOUS Dell Dean MD  oxyCODONE IR (ROXICODONE) tablet 5 mg  5 mg Oral ONCE PRN Francia Berry MD        HYDROmorphone (PF) (DILAUDID) injection 0.5 mg  0.5 mg IntraVENous Multiple Jaime Dean MD        naloxone [facility]) injection 0.1 mg  0.1 mg IntraVENous PRN Francia Berry MD        0.9% sodium chloride infusion 250 mL  250 mL IntraVENous PRN Eliza Coyle MD            Allergies   Allergen Reactions    Cefdinir Diarrhea    Sulfa (Sulfonamide Antibiotics) Unknown (comments)       Review of Systems:  Not obtained due to emergent situation         Objective:     Vitals:    01/03/20 1352 01/03/20 1413 01/03/20 1501 01/03/20 1505   BP: 111/58 117/60 136/70 170/74   Pulse: 65 71 70 79   Resp:       Temp:       SpO2: 97% 97% 100% 97%   Weight:       Height:              NIHSS   NIHSS Score: 2  1a-Level of Consciousness 0  1b-What is Month/Age 0  1c-Open/Close Eyes&Hand 0  2 -Best Gaze 0  3 -Visual Fields 0  4 -Facial Palsy 0  5a-Motor-Left Arm 0  5b-Motor-Right Arm 0  6a-Motor-Left Leg 0  6b-Motor-Right Leg 0  7 -Limb Ataxia 0  8 -Sensory 0  9 -Best Language 0  10-Dysarthria 2   11-Extinction/Inattention 0    Lab Results   Component Value Date/Time    Cholesterol, total 222 (H) 05/23/2018 08:55 AM    HDL Cholesterol 48 05/23/2018 08:55 AM    LDL, calculated 130 (H) 05/23/2018 08:55 AM    VLDL, calculated 44 (H) 05/23/2018 08:55 AM    Triglyceride 218 (H) 05/23/2018 08:55 AM        Lab Results   Component Value Date/Time    Hemoglobin A1c 5.4 05/23/2018 08:55 AM        Images personally reviewed by me at completion. CT Results (most recent):  Results from Hospital Encounter encounter on 01/03/20   CT PERF W CBF    Narrative CT Perfusion Imaging    INDICATION:  Postoperative altered mental status,. Assess for CVA. CT perfusion imaging of the brain was performed after the administration of  intravenous contrast.  Perfusion maps and perfusion analysis output were  generated using the [employer] perfusion processing software algorithm. Radiation  dose reduction techniques were used for this study: All CT scans performed at  this facility use one or all of the following: Automated exposure control,  adjustment of the mA and/or kVp according to patient's size, iterative  reconstruction. COMPARISON: None. Correlation is made to the CT scan of the brain and CTA  performed on the same day. [employer] Output Values:     CBF < 30% volume:  0 ml   (core infarction volume greater than 50 cc associated  with poor outcomes)  Tmax > 6 seconds: 0 ml  Tmax/CBF Mismatch Volume: 0 ml  Tmax/CBF Mismatch Ratio: Infinite  Hypoperfusion Intensity Ratio (Tmax > 10 seconds/Tmax > 6 seconds): 0   (values  greater than 0.5 associated with poor outcome)  Tmax > 10 seconds Volume: 0 ml   (volume greater than 100 mL is associated with  poor outcome)      Impression IMPRESSION: No CT evidence of acute perfusion abnormality. Please note that the determination of patient treatment is not based solely upon  imaging factors or calculation values. Management of ischemia is at the  discretion of the primary physician and is based upon a combination of clinical  and imaging data, along other factors. Results for orders placed or performed during the hospital encounter of 12/27/19   EKG, 12 LEAD, INITIAL   Result Value Ref Range    Ventricular Rate 62 BPM    Atrial Rate 62 BPM    P-R Interval 216 ms    QRS Duration 118 ms    Q-T Interval 464 ms    QTC Calculation (Bezet) 470 ms    Calculated P Axis 4 degrees    Calculated R Axis 53 degrees    Calculated T Axis 47 degrees    Diagnosis       Sinus rhythm with sinus arrhythmia with 1st degree A-V block  Right bundle branch block  Abnormal ECG  No previous ECGs available  Confirmed by [facility]  MD ()JESSICA ([id number]) on 12/28/2019 8:25:47 AM          MRI Results (most recent):   No results found for this or any previous visit.      Most recent CTA:    Reviewed by me shows no evidence of large vessel occlusion    Results from Hospital Encounter encounter on 01/03/20   CT PERF W CBF    Narrative CT Perfusion Imaging    INDICATION:  Postoperative altered mental status,. Assess for CVA. CT perfusion imaging of the brain was performed after the administration of  intravenous contrast.  Perfusion maps and perfusion analysis output were  generated using the [employer] perfusion processing software algorithm. Radiation  dose reduction techniques were used for this study: All CT scans performed at  this facility use one or all of the following: Automated exposure control,  adjustment of the mA and/or kVp according to patient's size, iterative  reconstruction. COMPARISON: None. Correlation is made to the CT scan of the brain and CTA  performed on the same day. [employer] Output Values:     CBF < 30% volume:  0 ml   (core infarction volume greater than 50 cc associated  with poor outcomes)  Tmax > 6 seconds: 0 ml  Tmax/CBF Mismatch Volume: 0 ml  Tmax/CBF Mismatch Ratio: Infinite  Hypoperfusion Intensity Ratio (Tmax > 10 seconds/Tmax > 6 seconds): 0   (values  greater than 0.5 associated with poor outcome)  Tmax > 10 seconds Volume: 0 ml   (volume greater than 100 mL is associated with  poor outcome)      Impression IMPRESSION: No CT evidence of acute perfusion abnormality. Please note that the determination of patient treatment is not based solely upon  imaging factors or calculation values. Management of ischemia is at the  discretion of the primary physician and is based upon a combination of clinical  and imaging data, along other factors. Most recent MRA:  No results found for this or any previous visit. Most recent Echo:  No results found for this visit on 01/03/20. Assessment:     Unjenna Haverizwan who is currently admitted for open prostatectomy. The patient was noted to be severely dysarthric versus a phasic on recovery from general anesthesia and a code S was called.    Initial NIHSS was 2. CT of the head was obtained and showed no evidence of hemorrhage or early infarction or intravascular thrombus. CTA of head neck was obtained and showed no evidence of large vessel occlusion. CT perfusion was normal.   The patient was not a candidate for alteplase because Of being postop for surgery at a noncompressive site. The patient was not a candidate for mechanical thrombectomy, as no large vessel occlusion was identified on CTA.       Symptoms were rapidly improving on return to the PACU      Plan:       · Initiate high intensity statin   · Neurochecks Q4H  · MRI of brain  · CTA of head and neck-done  · Bedside swallow test   · Labs: A1c, FLP, TSH, Cardiac enzymes, BMP, CBC  · Telemetry and echocardiogram with bubble study  · PT/OT/ST  · DVT prophylaxis   · BP management - allow permissive HTN to 220/120 x24 hours, treat with labetalol 10 mg IV or nicardipine gtt  · Smoking cessation if applicable   · Diabetes education if applicable   · Depression Screening prior to discharge      Signed By: Misha España MD     January 3, 2020      Critical care time in 1432  Critical care time out [id number] 18 [id number] 19

## 2020-01-03 NOTE — PROGRESS NOTES
01/03/20 1938   NIH Stroke Scale   Interval Other (comment)  (Dual NIH with Sloane Corcoran RN)   LOC 0   LOC Questions 0   LOC Commands 0   Best Gaze 0   Visual 0   Facial Palsy 0   Motor Right Arm 0   Motor Left Arm 0   Motor Right Leg 0   Motor Left Leg 0   Limb Ataxia 0   Sensory 0   Best Language 0   Dysarthria 0   Extinction and Inattention 0   Total 0

## 2020-01-03 NOTE — PROGRESS NOTES
Spiritual Care Visit, initial visit.  responded to a Rapid Response in PACU. Visited with patient's wife and daughter in Surgery Waiting room and gave them support. Discussed patient's condition. Prayed for patient's healing and health. Visit by Boby Swann, Staff .  M.Marcus., Th.B., B.A. 28-Apr-2019

## 2020-01-03 NOTE — PROGRESS NOTES
TRANSFER - IN REPORT:    Verbal report received from Summer on Austin Hudson  being received from PACU for routine progression of care      Report consisted of patients Situation, Background, Assessment and   Recommendations(SBAR). Information from the following report(s) SBAR and MAR was reviewed with the receiving nurse. Opportunity for questions and clarification was provided. Assessment completed upon patients arrival to unit and care assumed.

## 2020-01-03 NOTE — PERIOP NOTES
TRANSFER - OUT REPORT:    Verbal report given to Travis Valentino RN  on Jody Boateng  being transferred to  [phone]  for routine post - op       Report consisted of patients Situation, Background, Assessment and   Recommendations(SBAR). Information from the following report(s) SBAR, OR Summary, Procedure Summary, Intake/Output and MAR was reviewed with the receiving nurse. Lines:   Peripheral IV 01/03/20 Right Hand (Active)   Site Assessment Clean, dry, & intact 1/3/2020 12:48 PM   Phlebitis Assessment 0 1/3/2020 12:48 PM   Infiltration Assessment 0 1/3/2020 12:48 PM   Dressing Status Clean, dry, & intact 1/3/2020 12:48 PM   Dressing Type Tape;Transparent 1/3/2020 12:48 PM   Hub Color/Line Status Green; Infusing 1/3/2020 12:48 PM       Peripheral IV 01/03/20 Left Wrist (Active)   Site Assessment Clean, dry, & intact 1/3/2020 12:48 PM   Phlebitis Assessment 0 1/3/2020 12:48 PM   Infiltration Assessment 0 1/3/2020 12:48 PM   Dressing Status Clean, dry, & intact 1/3/2020 12:48 PM   Dressing Type Tape;Transparent 1/3/2020 12:48 PM   Hub Color/Line Status Kevan Kocher; Infusing 1/3/2020 12:48 PM        Opportunity for questions and clarification was provided. Patient transported with:   O2 @ 2 liters    VTE prophylaxis orders have been written for Jody Boateng. Patient and family given floor number and nurses name. Family updated re: pt status after security code verified.

## 2020-01-03 NOTE — PROGRESS NOTES
01/03/20 1710   Dual Skin Pressure Injury Assessment   Dual Skin Pressure Injury Assessment WDL   Second Care Provider (Based on [address] [place] [address]) Lexii Samayoa RN   Skin Integumentary   Skin Integumentary (WDL) X   Skin Color Appropriate for ethnicity   Skin Condition/Temp Warm;Dry   Skin Integrity Incision (comment); Other (comment)  (mid abdomen)   Turgor Non-tenting

## 2020-01-03 NOTE — ANESTHESIA POSTPROCEDURE EVALUATION
Procedure(s):  OPEN SIMPLE PROSTATECTOMY  WITH BLADDER STONE REMOVAL. general    Anesthesia Post Evaluation        Patient location during evaluation: PACU  Patient participation: complete - patient participated  Level of consciousness: responsive to verbal stimuli  Pain management: adequate  Airway patency: patent  Anesthetic complications: no  Cardiovascular status: acceptable  Respiratory status: acceptable  Hydration status: euvolemic        Vitals Value Taken Time   /54 1/3/2020 12:33 PM   Temp 36.5 °C (97.7 °F) 1/3/2020 11:25 AM   Pulse 63 1/3/2020 12:37 PM   Resp 16 1/3/2020 11:33 AM   SpO2 96 % 1/3/2020 12:37 PM   Vitals shown include unvalidated device data.

## 2020-01-03 NOTE — INTERVAL H&P NOTE
H&P Update:  Juliann Trammell was seen and examined. History and physical has been reviewed. The patient has been examined.  There have been no significant clinical changes since the completion of the originally dated History and Physical.

## 2020-01-03 NOTE — BRIEF OP NOTE
BRIEF OPERATIVE NOTE    Date of Procedure: 1/3/2020   Preoperative Diagnosis: Benign prostatic hyperplasia, unspecified whether lower urinary tract symptoms present [N40.0]  Bladder stone [N21.0]  Postoperative Diagnosis: Benign prostatic hyperplasia, unspecified whether lower urinary tract symptoms present [N40.0]  Bladder stone [N21.0]    Procedure(s):  OPEN SIMPLE PROSTATECTOMY  WITH BLADDER STONE REMOVAL  Surgeon(s) and Role:     * Dennie Mead, MD - Primary     * Dionna Rios MD - Assisting         Surgical Staff:  Circ-1: Devon Moore RN  Circ-Relief: Allen Thomson RN; William Munoz, TUNG  Scrub Tech-1: [facility] Inder  Event Time In Time Out   Incision Start 9691    Incision Close 1048      Anesthesia: General   Estimated Blood Loss: 2150ml  Specimens:   ID Type Source Tests Collected by Time Destination   1 : Bladder stone Fresh Bladder  Dennie Mead, MD 1/3/2020 0830 Pathology   2 : Prostatic adenoma Fresh Prostate  Dennie Mead, MD 1/3/2020 8436 Pathology      Findings: see dictation   Complications: none apparent  Implants: * No implants in log *

## 2020-01-03 NOTE — PERIOP NOTES
Pt awakening and severe difficulty forming words, pt has bilateral equal  strength, no drift in any extremity, negative ataxia, + slurring of speech, +aphasia and + dysphagia.  Aida Guillory MD made aware, wife brought to patient bedside and states speech is abnormal. Code S to be called after being evaluated by Aida Guillory MD.

## 2020-01-04 LAB
ANION GAP SERPL CALC-SCNC: 5 MMOL/L (ref 7–16)
BUN SERPL-MCNC: 17 MG/DL (ref 8–23)
CALCIUM SERPL-MCNC: 8.2 MG/DL (ref 8.3–10.4)
CHLORIDE SERPL-SCNC: 110 MMOL/L (ref 98–107)
CO2 SERPL-SCNC: 25 MMOL/L (ref 21–32)
CREAT SERPL-MCNC: 1.28 MG/DL (ref 0.8–1.5)
ERYTHROCYTE [DISTWIDTH] IN BLOOD BY AUTOMATED COUNT: 14 % (ref 11.9–14.6)
GLUCOSE SERPL-MCNC: 162 MG/DL (ref 65–100)
HCT VFR BLD AUTO: 30.3 % (ref 41.1–50.3)
HGB BLD-MCNC: 10.6 G/DL (ref 13.6–17.2)
MAGNESIUM SERPL-MCNC: 1.9 MG/DL (ref 1.8–2.4)
MCH RBC QN AUTO: 31.2 PG (ref 26.1–32.9)
MCHC RBC AUTO-ENTMCNC: 35 G/DL (ref 31.4–35)
MCV RBC AUTO: 89.1 FL (ref 79.6–97.8)
NRBC # BLD: 0 K/UL (ref 0–0.2)
PLATELET # BLD AUTO: 145 K/UL (ref 150–450)
PMV BLD AUTO: 9 FL (ref 9.4–12.3)
POTASSIUM SERPL-SCNC: 3.7 MMOL/L (ref 3.5–5.1)
RBC # BLD AUTO: 3.4 M/UL (ref 4.23–5.6)
SODIUM SERPL-SCNC: 140 MMOL/L (ref 136–145)
WBC # BLD AUTO: 14.3 K/UL (ref 4.3–11.1)

## 2020-01-04 PROCEDURE — 74011250637 HC RX REV CODE- 250/637: Performed by: UROLOGY

## 2020-01-04 PROCEDURE — 77030020253 HC SOL INJ D545NS .05 DEX .45 SAL

## 2020-01-04 PROCEDURE — 83735 ASSAY OF MAGNESIUM: CPT

## 2020-01-04 PROCEDURE — 77030018836 HC SOL IRR NACL ICUM -A

## 2020-01-04 PROCEDURE — 65660000000 HC RM CCU STEPDOWN

## 2020-01-04 PROCEDURE — 74011250636 HC RX REV CODE- 250/636: Performed by: UROLOGY

## 2020-01-04 PROCEDURE — 80048 BASIC METABOLIC PNL TOTAL CA: CPT

## 2020-01-04 PROCEDURE — 85027 COMPLETE CBC AUTOMATED: CPT

## 2020-01-04 PROCEDURE — 92610 EVALUATE SWALLOWING FUNCTION: CPT

## 2020-01-04 PROCEDURE — 36415 COLL VENOUS BLD VENIPUNCTURE: CPT

## 2020-01-04 PROCEDURE — 97530 THERAPEUTIC ACTIVITIES: CPT

## 2020-01-04 PROCEDURE — 97161 PT EVAL LOW COMPLEX 20 MIN: CPT

## 2020-01-04 RX ORDER — DEXTROSE MONOHYDRATE AND SODIUM CHLORIDE 5; .45 G/100ML; G/100ML
75 INJECTION, SOLUTION INTRAVENOUS CONTINUOUS
Status: DISCONTINUED | OUTPATIENT
Start: 2020-01-04 | End: 2020-01-05

## 2020-01-04 RX ADMIN — HYDROMORPHONE HYDROCHLORIDE 1 MG: 1 INJECTION, SOLUTION INTRAMUSCULAR; INTRAVENOUS; SUBCUTANEOUS at 15:04

## 2020-01-04 RX ADMIN — Medication 10 ML: at 11:45

## 2020-01-04 RX ADMIN — DOCUSATE SODIUM 100 MG: 100 CAPSULE, LIQUID FILLED ORAL at 15:04

## 2020-01-04 RX ADMIN — HYDROMORPHONE HYDROCHLORIDE 1 MG: 1 INJECTION, SOLUTION INTRAMUSCULAR; INTRAVENOUS; SUBCUTANEOUS at 20:55

## 2020-01-04 RX ADMIN — Medication 5 ML: at 20:55

## 2020-01-04 RX ADMIN — HYDROMORPHONE HYDROCHLORIDE 1 MG: 1 INJECTION, SOLUTION INTRAMUSCULAR; INTRAVENOUS; SUBCUTANEOUS at 03:49

## 2020-01-04 RX ADMIN — DUTASTERIDE 0.5 MG: 0.5 CAPSULE, LIQUID FILLED ORAL at 07:47

## 2020-01-04 RX ADMIN — DOCUSATE SODIUM 100 MG: 100 CAPSULE, LIQUID FILLED ORAL at 07:47

## 2020-01-04 RX ADMIN — HYDROCODONE BITARTRATE AND ACETAMINOPHEN 1 TABLET: 5; 325 TABLET ORAL at 07:47

## 2020-01-04 NOTE — ANESTHESIA PROCEDURE NOTES
Peripheral Block    Start time: 1/3/2020 11:02 AM  End time: 1/3/2020 11:06 AM  Performed by: Dinh Mcmanus MD  Authorized by: Dinh Mcmanus MD       Pre-procedure:    Indications: at surgeon's request and post-op pain management    Preanesthetic Checklist: patient identified, risks and benefits discussed, site marked, timeout performed, anesthesia consent given and patient being monitored    Timeout Time: 11:02          Block Type:   Block Type:  Rectus sheath (Rectus abdominis sheath )  Laterality:  Left  Monitoring:  Oxygen, standard ASA monitoring, continuous pulse ox, heart rate and frequent vital sign checks  Injection Technique:  Single shot  Procedures: ultrasound guided    Patient Position: supine  Prep: chlorhexidine    Location:  Abdominal  Needle Type:  Stimuplex  Needle Gauge:  22 G  Needle Localization:  Ultrasound guidance    Assessment:  Number of attempts:  1  Injection Assessment:  Incremental injection every 5 mL, ultrasound image on chart, local visualized surrounding nerve on ultrasound, negative aspiration for blood and no intravascular symptoms  Patient tolerance:  Patient tolerated the procedure well with no immediate complications

## 2020-01-04 NOTE — PROGRESS NOTES
Problem: Dysphagia (Adult)  Goal: *Acute Goals and Plan of Care (Insert Text)  Description  STG: Pt will demonstrate zero signs/sx of aspiration with thin liquids with 90% accuracy during all meals. LTG: Pt will demonstrate zero signs/sx of aspiration with least restrictive diet with 100% accuracy during all meals. SPEECH LANGUAGE PATHOLOGY: DYSPHAGIA- Initial Assessment    NAME/AGE/GENDER: Maria Isabel Rios is a 78 y.o. male  DATE: 1/4/2020  PRIMARY DIAGNOSIS: Benign prostatic hyperplasia, unspecified whether lower urinary tract symptoms present [N40.0]  Bladder stone [N21.0]  Bladder stone [N21.0]  BPH with obstruction/lower urinary tract symptoms [N40.1, N13.8]  Procedure(s) (LRB):  OPEN SIMPLE PROSTATECTOMY (N/A)  WITH BLADDER STONE REMOVAL (N/A) 1 Day Post-Op  ICD-10: Treatment Diagnosis: R13.12 Dysphagia, Oropharyngeal Phase    INTERDISCIPLINARY COLLABORATION: Registered Nurse  PRECAUTIONS/ALLERGIES: Cefdinir and Sulfa (sulfonamide antibiotics)       SUBJECTIVE   Alert, wife present     History of Present Injury/Illness: Mr. Priya Lackey  has a past medical history of Arthritis, BPH (benign prostatic hyperplasia), Calculus of ureter, Elevated prostate specific antigen (PSA), Family history of malignant neoplasm of prostate, First degree AV block, Hematospermia, Hematuria, microscopic, Hypertension, Hypertrophy of prostate without urinary obstruction and other lower urinary tract symptoms (LUTS), Ill-defined condition, Impotence of organic origin, Incomplete right bundle branch block, and Renal colic. Melissa Riser He also  has a past surgical history that includes biopsy prostate; hx colonoscopy (2016); and hx hernia repair (AS CHILD).       Problem List:  (Impairments causing functional limitations):  dysphagia   Previous Dysphagia: NONE REPORTED    Diet Prior to Evaluation: clears       Orientation:   Person  Place  Time  Situation    Pain: Pain Scale 1: Visual  Pain Intensity 1: 0  Pain Location 1: Abdomen  Pain Intervention(s) 1: Meditation       Cognitive-Linguistic Screen:  Speech Production:   WNL  Expressive Language: WNL    Receptive Language: WNL    Cognition:   WNL         OBJECTIVE   Oral Motor:   Labial: No impairment  Dentition: Natural  Oral Hygiene: Adequate  Lingual: No impairment     Swallow assessment:   Patient presented with thin liquids via cup and straw only. Pt on clear liquid diet. Timely swallow. No overt signs/sx of aspiration. ASSESSMENT   Patient presents with normal oropharyngeal swallow function with limited PO on this date. No overt signs/sx of aspiration with thin liquids via cup and straw. Pt on a clear liquid diet. No other PO assessed on this date. Will follow for PO trials when pt's diet is upgraded by MD.         Tool Used: Dysphagia Outcome and Severity Scale (GONZALO)    Score Comments   Normal Diet  [] 7 With no strategies or extra time needed   Functional Swallow  [x] 6 May have mild oral or pharyngeal delay   Mild Dysphagia  [] 5 Which may require one diet consistency restricted    Mild-Moderate Dysphagia  [] 4 With 1-2 diet consistencies restricted   Moderate Dysphagia  [] 3 With 2 or more diet consistencies restricted   Moderate-Severe Dysphagia  [] 2 With partial PO strategies (trials with ST only)   Severe Dysphagia  [] 1 With inability to tolerate any PO safely      Score:  Initial: 6 Most Recent: (Date 01/04/20 )   Interpretation of Tool: The Dysphagia Outcome and Severity Scale (GONZALO) is a simple, easy-to-use, 7-point scale developed to systematically rate the functional severity of dysphagia based on objective assessment and make recommendations for diet level, independence level, and type of nutrition. Current Medications:   No current facility-administered medications on file prior to encounter. Current Outpatient Medications on File Prior to Encounter   Medication Sig Dispense Refill    losartan (COZAAR) 100 mg tablet Take 100 mg by mouth.       amLODIPine (NORVASC) 5 mg tablet 1 every day for BP 90 Tab 12    sildenafil citrate (VIAGRA) 100 mg tablet Take 1 Tab by mouth as needed (ed). 4 Tab 2         PLAN    FREQUENCY/DURATION: Continue to follow patient 1 time a week for duration of hospital stay to address above goals. - Recommendations for next treatment session: Next treatment will address PO trials      REHABILITATION POTENTIAL FOR STATED GOALS: Good     COMPLIANCE WITH PROGRAM/EXERCISES: Will assess as treatment progresses    CONTINUATION OF SKILLED SERVICES/MEDICAL NECESSITY:  Patient is expected to demonstrate progress in  diet tolerance in order to  work toward diet advancement and decrease aspiration risk. Patient continues to require skilled intervention due to dysphagia         RECOMMENDATIONS   DIET:   Liquids:  regular thin    MEDICATIONS: With liquid     ASPIRATION PRECAUTIONS  Slow rate of intake  Small bites/sips  Upright at 90 degrees during meal     COMPENSATORY STRATEGIES/MODIFICATIONS  None     EDUCATION:  Recommendations discussed with Nursing  Family  Patient     RECOMMENDATIONS for CONTINUED SPEECH THERAPY:   YES: Anticipate need for ongoing speech therapy during this hospitalization.        SAFETY:  After treatment position/precautions:  Upright in bed  RN notified  family at bedside  Call light within reach    Total Treatment Duration:   Time In: 0930  Time Out: [place] MA/CCC/SLP

## 2020-01-04 NOTE — ANESTHESIA PROCEDURE NOTES
Peripheral Block    Start time: 1/3/2020 10:58 AM  End time: 1/3/2020 11:02 AM  Performed by: Francia Berry MD  Authorized by: Francia Berry MD       Pre-procedure:    Indications: at surgeon's request and post-op pain management    Preanesthetic Checklist: patient identified, risks and benefits discussed, site marked, timeout performed, anesthesia consent given and patient being monitored    Timeout Time: 10:58          Block Type:   Block Type:  TAP (Rectus abdominis sheath )  Laterality:  Right  Monitoring:  Standard ASA monitoring, continuous pulse ox, oxygen, frequent vital sign checks and heart rate  Injection Technique:  Single shot  Procedures: ultrasound guided    Patient Position: supine  Prep: chlorhexidine    Location:  Abdominal  Needle Type:  Stimuplex  Needle Gauge:  22 G  Needle Localization:  Ultrasound guidance    Assessment:  Number of attempts:  1  Injection Assessment:  Incremental injection every 5 mL, ultrasound image on chart, local visualized surrounding nerve on ultrasound, negative aspiration for blood and no intravascular symptoms  Patient tolerance:  Patient tolerated the procedure well with no immediate complications

## 2020-01-04 NOTE — PROGRESS NOTES
01/04/20 1839   NIH Stroke Scale   Interval Other (comment)  (Discharge [facility] with Nitin Russo RN)   LOC 0   LOC Questions 0   LOC Commands 0   Best Gaze 0   Visual 0   Facial Palsy 0   Motor Right Arm 0   Motor Left Arm 0   Motor Right Leg 0   Motor Left Leg 0   Limb Ataxia 0   Sensory 0   Best Language 0   Dysarthria 0   Extinction and Inattention 0   Total 0

## 2020-01-04 NOTE — PROGRESS NOTES
Problem: Mobility Impaired (Adult and Pediatric)  Goal: *Acute Goals and Plan of Care (Insert Text)  Description  LTG:  (1.)Mr. Jenifer Reed will move from supine to sit and sit to supine , scoot up and down and roll side to side with INDEPENDENT within 7 treatment day(s) through log rolling.    (2.)Mr. Jenifer Reed will transfer from bed to chair and chair to bed with SUPERVISION using the least restrictive device within 7 treatment day(s). (3.)Mr. Jenifer Reed will ambulate with SUPERVISION for 500+ feet with the least restrictive device within 7 treatment day(s) while maintaining normal vital signs. (4.)Mr. Jenifer Reed will perform 2 steps with CGA for safety ascending and descending stairs for home within 7 treatment days. _____________________________________________________________________________________________   Outcome: Progressing Towards Goal     PHYSICAL THERAPY: Initial Assessment and PM 1/4/2020  INPATIENT: PT Visit Days : 1  Payor: Pari Espinoza / Plan: [id number] PPO/PFFS / Product Type: [employer] /       NAME/AGE/GENDER: Wayne Carroll is a 78 y.o. male   PRIMARY DIAGNOSIS: Benign prostatic hyperplasia, unspecified whether lower urinary tract symptoms present [N40.0]  Bladder stone [N21.0]  Bladder stone [N21.0]  BPH with obstruction/lower urinary tract symptoms [N40.1, N13.8] <principal problem not specified> <principal problem not specified>  Procedure(s) (LRB):  OPEN SIMPLE PROSTATECTOMY (N/A)  WITH BLADDER STONE REMOVAL (N/A)  1 Day Post-Op  ICD-10: Treatment Diagnosis:    Difficulty in walking, Not elsewhere classified (R26.2)   Precaution/Allergies:  Cefdinir and Sulfa (sulfonamide antibiotics)      ASSESSMENT:     Mr. Jenifer Reed presents with decreased bed mobility, transfers, ambulation, balance, activity tolerance and overall general functional mobility s/p hospital admission with above surgery on 1/3/20.  Pt with code S called 1/3 after surgery for slurred speech, aphasia, CVA workup negative for stoke. Pt A & O x 4 this date, on room air, states pain 4/10 at rest but declines medication. Pt spouse and daughter present during assessment. Family reports pt back at baseline for speech and mentation. Pt reports lives in 2 story home on main level, independent at baseline for ambulation, ADLs, drives. Pt states no falls at home. Pt with MIN A for bed mobility through log rolling, CGA to MIN A transfers. Pt ambulated with RW into hallway for 250', CGA. Pt seated in chair upon return to room, needs in reach. PT to cont to follow for acute care needs. This section established at most recent assessment   PROBLEM LIST (Impairments causing functional limitations):  Decreased ADL/Functional Activities  Decreased Transfer Abilities  Decreased Ambulation Ability/Technique  Decreased Balance  Increased Pain  Decreased Activity Tolerance   INTERVENTIONS PLANNED: (Benefits and precautions of physical therapy have been discussed with the patient.)  Balance Exercise  Bed Mobility  Family Education  Gait Training  Home Exercise Program (HEP)  Therapeutic Activites  Therapeutic Exercise/Strengthening  Transfer Training     TREATMENT PLAN: Frequency/Duration: 3 times a week for duration of hospital stay  Rehabilitation Potential For Stated Goals: Good     REHAB RECOMMENDATIONS (at time of discharge pending progress):    Placement: It is my opinion, based on this patient's performance to date, that Mr. Priya Lackey may benefit from participating in 1-2 additional therapy sessions in order to continue to assess for rehab potential and then make recommendation for disposition at discharge.  Possible none vs HHPT pending progress with mobility  Equipment:   None at this time              HISTORY:   History of Present Injury/Illness (Reason for Referral):  Maria Isabel Rios is a 78 y.o. male followed secondary to an elevated PSA, BPH, and + family history of prostate cancer (father and identical twin brother). He was previously followed by Dr. Manuel Curtis. He is taking avodart since 6/09. He underwent prostate biopsy 5/02 secondary to psa of 3.4. 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 Repeat prostate biopsy 8/02 revealed all benign cores. LUTS have improved on avodart. In regards to ED, he uses 100mg viagra with good results. He had an episode of gross hematuria. 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 CT 5/23/14 revealed pelvic mass to be a VERY LARGE prostate (180 gr by estimation) with indentation into the bladder. Cystoscopy 5/14 revealed the enlarged prostate and no bladder pathology. He returned in 6/17 with 6 month history of gross hematuria 2-3 times per week. It could be heavy at times. LUTS are well controlled on his avodart. He stopped 81 mg asa in 6/17 and gross hematuria has decreased substantially. We discussed repeating cystoscopy and he wanted to hold off for now since we have done that as recently as '15. He understands a very low risk of missing a bladder cancer, although bleeding is much more likely from a prostatic source. He is continuing to have gross hematuria, light, with exercise. He has also passed a few stones and had 2 UTI's in the last year. Past Medical History/Comorbidities:   Mr. Flakita Samaniego  has a past medical history of Arthritis, BPH (benign prostatic hyperplasia), Calculus of ureter, Elevated prostate specific antigen (PSA), Family history of malignant neoplasm of prostate, First degree AV block, Hematospermia, Hematuria, microscopic, Hypertension, Hypertrophy of prostate without urinary obstruction and other lower urinary tract symptoms (LUTS), Ill-defined condition, Impotence of organic origin, Incomplete right bundle branch block, and Renal colic. Mr. Flakita Samaniego  has a past surgical history that includes biopsy prostate; hx colonoscopy (2016); and hx hernia repair (AS CHILD).   Social History/Living Environment:   Current DME Used/Available at Home: None  Prior Level of Function/Work/Activity:  Lives with spouse, independent at baseline, drives  Personal Factors:          Sex:  male        Age:  78 y.o. Overall Behavior:  agreeable   Number of Personal Factors/Comorbidities that affect the Plan of Care:  Age, HTN, TIA? 3+: HIGH COMPLEXITY   EXAMINATION:   Most Recent Physical Functioning:   Gross Assessment:  AROM: Generally decreased, functional(B LE)  Strength: Generally decreased, functional(B LE)  Coordination: Generally decreased, functional               Posture:  Posture (WDL): Within defined limits  Balance:  Sitting: Intact  Standing: Impaired  Standing - Static: Good;Fair  Standing - Dynamic : Fair Bed Mobility:  Rolling: Contact guard assistance;Minimum assistance(log rolling)  Supine to Sit: Minimum assistance  Sit to Supine: (left up in chair)  Scooting: Contact guard assistance  Wheelchair Mobility:     Transfers:  Sit to Stand: Contact guard assistance;Minimum assistance  Stand to Sit: Contact guard assistance  Bed to Chair: Contact guard assistance  Gait:     Base of Support: Widened  Speed/Lori: Slow  Step Length: Left shortened;Right shortened  Swing Pattern: Left symmetrical;Right symmetrical  Gait Abnormalities: Decreased step clearance  Distance (ft): 250 Feet (ft)  Assistive Device: Gait belt;Walker, rolling  Ambulation - Level of Assistance: Contact guard assistance  Interventions: Verbal cues      Body Structures Involved:  Muscles Body Functions Affected: Movement Related Activities and Participation Affected:  General Tasks and Demands  Mobility  Self Care   Number of elements that affect the Plan of Care: 4+: HIGH COMPLEXITY   CLINICAL PRESENTATION:   Presentation: Evolving clinical presentation with changing clinical characteristics: MODERATE COMPLEXITY   CLINICAL DECISION MAKIN [facility] Inpatient Short Form  How much difficulty does the patient currently have. .. Unable A Lot A Little None   1. Turning over in bed (including adjusting bedclothes, sheets and blankets)? [] 1   [] 2   [x] 3   [] 4   2. Sitting down on and standing up from a chair with arms ( e.g., wheelchair, bedside commode, etc.)   [] 1   [] 2   [x] 3   [] 4   3. Moving from lying on back to sitting on the side of the bed? [] 1   [] 2   [x] 3   [] 4   How much help from another person does the patient currently need. .. Total A Lot A Little None   4. Moving to and from a bed to a chair (including a wheelchair)? [] 1   [] 2   [x] 3   [] 4   5. Need to walk in hospital room? [] 1   [] 2   [x] 3   [] 4   6. Climbing 3-5 steps with a railing? [] 1   [] 2   [x] 3   [] 4   © 2007, Trustees of [facility] [employer], under license to [employer]. All rights reserved      Score:  Initial: 18 Most Recent: X (Date: -- )    Interpretation of Tool:  Represents activities that are increasingly more difficult (i.e. Bed mobility, Transfers, Gait). Medical Necessity:     Patient is expected to demonstrate progress in   strength, range of motion, balance, and coordination   to   decrease assistance required with overall functional mobility, transfers, and ambulation   . Patient demonstrates   good   rehab potential due to higher previous functional level. Reason for Services/Other Comments:  Patient   continues to require present interventions due to patient's inability to perform bed mobility, transfers, ambulation safely and effectively   .    Use of outcome tool(s) and clinical judgement create a POC that gives a: Clear prediction of patient's progress: LOW COMPLEXITY            TREATMENT:   (In addition to Assessment/Re-Assessment sessions the following treatments were rendered)   Pre-treatment Symptoms/Complaints:  \"I am doing ok\"  Pain: Initial:   Pain Intensity 1: 4(declines medication)  Pain Location 1: Abdomen  Post Session:  5/10 post session in chair, declines medication     Therapeutic Activity: ( 8 min): Therapeutic activities including Bed transfers, Chair transfers, Ambulation on level ground, and walker safety, posture to improve mobility, strength, balance, and coordination. Required minimal Verbal cues to promote static and dynamic balance in standing and promote coordination of bilateral, lower extremity(s). Braces/Orthotics/Lines/Etc:   IV  O2 Device: Room air  Treatment/Session Assessment:    Response to Treatment:  tolerated well  Interdisciplinary Collaboration:   Physical Therapist  Registered Nurse  After treatment position/precautions:   Up in chair  Bed/Chair-wheels locked  Bed in low position  Call light within reach  RN notified  Family at bedside   Compliance with Program/Exercises: Will assess as treatment progresses  Recommendations/Intent for next treatment session: \"Next visit will focus on advancements to more challenging activities\".   Total Treatment Duration:  PT Patient Time In/Time Out  Time In: 1245  Time Out: 0363 Rylie Matta, PT

## 2020-01-04 NOTE — OP NOTES
[facility]  OPERATIVE REPORT    Name:  Abilio Philip  MR#:  [id number]  :  1940  ACCOUNT #:  [de-identified]  DATE OF SERVICE:  2020    PREOPERATIVE DIAGNOSES:  1.  Bladder stone. 2.  Benign prostatic hyperplasia. 3.  Recurrent urinary tract infections. POSTOPERATIVE DIAGNOSES:  1.  Bladder stone. 2.  Benign prostatic hyperplasia. 3.  Recurrent urinary tract infections. PROCEDURE PERFORMED:  1. Cystotomy with stone extraction. 2.  Open simple prostatectomy. SURGEON:  Mehul Winters MD    ASSISTANT:  Sg Higuera. ANESTHESIA:  General.    COMPLICATIONS:  None apparent. SPECIMENS REMOVED:  Prostatic adenoma and bladder stone. IMPLANTS:  None. ESTIMATED BLOOD LOSS:  2.1 liters. INDICATIONS:  This is a 77-year-old gentleman who over the last years had worsening lower urinary tract symptoms despite being on Avodart. He began having recurrent urinary tract infections and gross hematuria with exercise and CAT scan on  revealed a 2.7-cm bladder stone and mass in the prostate. After discussion of the risks versus benefits, he decided to move forward with the above-named procedures. TECHNIQUE:  The patient was taken to the operating room, placed in supine position. Anesthesia was induced via Anesthesiology Service. He had been shaved and he was then prepped and draped in sterile surgical fashion with the genitalia and lower abdomen in a sterile field. An infraumbilical midline incision was made and carried down through the fascia, exposing the anterior surface of the bladder. The anterior surface the bladder was opened using electrocautery, exposing the stone, and a portion of prostate that was within the bladder at the floor. The stone was removed. We began by identifying the ureteral orifices and not injuring them during the procedure.   The mucosa of the bladder was incised circumferentially around the base of the prostatic tissue and using scissors and blunt dissection with finger, the dissection was carried down, removing a large portion of adenomatous prostate tissue greater than 100 g. The dissection time for this portion of the procedure was probably in the 15 to 20-minute range to remove that portion of adenoma. We then packed the prostatic fossa and using 2-0 Vicryl stitches on UR-6, placed several stitches to control bleeding around the prostatic fossa or sites. Electrocautery was also used to gain control in several places and the deepest portion of the prostatic fossa on the left side. I placed two pieces of Surgicel at the end of the procedure and this period of time where we gained hemostasis probably took 45 minutes to an hour and this was the portion of time when we had blood loss mentioned above. After this portion been performed, the field was relatively hemostatic. A 24-Nigerien, 30-mL three-way catheter was then placed through the urethra through the prostatic fossa and into the bladder. The bladder was closed in two layers using 2-0 Vicryl in a running fashion. The balloon was then inflated on the catheter and the catheter irrigated clear. There was no sign of leakage at the site of the incision of the bladder and the fascia was then closed using a running #1 PDS, followed by closure of the skin using stapler. The patient left the operating room with continuous bladder irrigation running rather slowly and only light pink in color, very satisfactory condition considering the procedure he had done today.       Verito Pitt MD      [id number]  D:  01/03/2020 12:24  T:  01/03/2020 23:36  JOB #:  [id number]

## 2020-01-04 NOTE — PROGRESS NOTES
Urology Progress Note    Subjective:     Daily Progress Note: 2020 11:32 AM    Cathryn Milan is 1 Day Post-Op and doing good. He reports pain is well controlled. He has no new complaints. He is tolerating clear liquids and requiring help to get out of bed. Indwelling catheter is draining well., Tinged urine with irrigation going but remarkably clear considering the procedure he had yesterday. Patient may have had a TIA after his procedure yesterday had a fair amount of blood loss with the patient looks a lot better today. The urine is remarkably clear considering the procedure he had and the irrigation was slowed down.     Objective:     Visit Vitals  /77 (BP 1 Location: Left arm, BP Patient Position: At rest)   Pulse 74   Temp 98.5 °F (36.9 °C)   Resp 18   Ht 5' 9\" (1.753 m)   Wt 186 lb (84.4 kg)   SpO2 94%   BMI 27.47 kg/m²        Temp (24hrs), Av °F (36.7 °C), Min:97.4 °F (36.3 °C), Max:98.5 °F (36.9 °C)      Intake and Output:   1901 -  0700  In: 7068 [I.V.:4200]  Out: 7200    0701 -  1900  In: 1000   Out: 5800     Physical Exam:   General appearance: alert, fatigued, cooperative, no distress  Abdomen: His abdomen appears nontender dressing is dry and clean        Lab/Data Review:  Recent Results (from the past 24 hour(s))   CBC W/O DIFF    Collection Time: 20 11:47 AM   Result Value Ref Range    WBC 12.8 (H) 4.3 - 11.1 K/uL    RBC 3.59 (L) 4.23 - 5.6 M/uL    HGB 11.3 (L) 13.6 - 17.2 g/dL    HCT 32.6 (L) 41.1 - 50.3 %    MCV 90.8 79.6 - 97.8 FL    MCH 31.5 26.1 - 32.9 PG    MCHC 34.7 31.4 - 35.0 g/dL    RDW 13.5 11.9 - 14.6 %    PLATELET 913 792 - 013 K/uL    MPV 8.5 (L) 9.4 - 12.3 FL    ABSOLUTE NRBC 0.00 0.0 - 0.2 K/uL   METABOLIC PANEL, BASIC    Collection Time: 20 11:47 AM   Result Value Ref Range    Sodium 141 136 - 145 mmol/L    Potassium 3.4 (L) 3.5 - 5.1 mmol/L    Chloride 110 (H) 98 - 107 mmol/L    CO2 26 21 - 32 mmol/L    Anion gap 5 (L) 7 - 16 mmol/L    Glucose 178 (H) 65 - 100 mg/dL    BUN 20 8 - 23 MG/DL    Creatinine 1.40 0.8 - 1.5 MG/DL    GFR est AA >60 >60 ml/min/1.73m2    GFR est non-AA 52 (L) >60 ml/min/1.73m2    Calcium 7.7 (L) 8.3 - 51.4 MG/DL   METABOLIC PANEL, BASIC    Collection Time: 01/04/20  6:12 AM   Result Value Ref Range    Sodium 140 136 - 145 mmol/L    Potassium 3.7 3.5 - 5.1 mmol/L    Chloride 110 (H) 98 - 107 mmol/L    CO2 25 21 - 32 mmol/L    Anion gap 5 (L) 7 - 16 mmol/L    Glucose 162 (H) 65 - 100 mg/dL    BUN 17 8 - 23 MG/DL    Creatinine 1.28 0.8 - 1.5 MG/DL    GFR est AA >60 >60 ml/min/1.73m2    GFR est non-AA 58 (L) >60 ml/min/1.73m2    Calcium 8.2 (L) 8.3 - 10.4 MG/DL   CBC W/O DIFF    Collection Time: 01/04/20  6:12 AM   Result Value Ref Range    WBC 14.3 (H) 4.3 - 11.1 K/uL    RBC 3.40 (L) 4.23 - 5.6 M/uL    HGB 10.6 (L) 13.6 - 17.2 g/dL    HCT 30.3 (L) 41.1 - 50.3 %    MCV 89.1 79.6 - 97.8 FL    MCH 31.2 26.1 - 32.9 PG    MCHC 35.0 31.4 - 35.0 g/dL    RDW 14.0 11.9 - 14.6 %    PLATELET 834 (L) 479 - 450 K/uL    MPV 9.0 (L) 9.4 - 12.3 FL    ABSOLUTE NRBC 0.00 0.0 - 0.2 K/uL   MAGNESIUM    Collection Time: 01/04/20  6:12 AM   Result Value Ref Range    Magnesium 1.9 1.8 - 2.4 mg/dL         Assessment/Plan:     Active Problems:    Bladder stone (1/3/2020)      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 In the immediate postop period he might of had a TIA. The amount of bleeding from the catheter is minimal better than expected. Hemoglobin is stable. Status Post:  Procedure(s):  OPEN SIMPLE PROSTATECTOMY  WITH BLADDER STONE REMOVAL     Plan:  Continue IV fluids at this point, increase his diet to regular, continue the bladder irrigation at a slower rate. He will need to keep the catheter for about a week. No change in neurologic status noted.

## 2020-01-04 NOTE — PROGRESS NOTES
Neurology Daily Progress Note     I have seen and examined the patient along with Wally Cortes NP. I agree with the documentation as recorded. In addition I would add:    I have seen the patient independently during which time I reviewed the history and performed a physical examination, reviewed the NP documentation and discussed with the NP . The interval history obtained is notable for: Feeling well today. Back to neurological baseline. The physical examination performed is notable for: Neurologic exam normal except for mild postural tremor and tremor of voice    The medical decision making including assessment and recommendations is notable for: TIA. Dyslipidemia. Recommend low-dose aspirin 81 mg at least 3 times per week once cleared by urology and intensive statin therapy (Lipitor 40 mg or 80 mg versus Crestor 20 mg or 40 mg). Patient will need to follow-up with me after discharge in clinic      Shy Palacio MD      Assessment:     TIA . Code S s/p prostatectomy with large amount of blood loss during surgery. Initial NIHSS 2 for dysarthria/aphagia with rapid resolution of symptoms. CT of the head was obtained and showed no evidence of hemorrhage or early infarction or intravascular thrombus. CTA of head neck was obtained and showed no evidence of large vessel occlusion. CT perfusion was normal. MRI of brain negative for acute intracranial abnormalities. Plan:     · Recommend starting ASA 81 mg daily once cleared by urology. · Initiate high intensity statin   · Neurochecks Q4H  · Telemetry   · PT/OT/ST  · DVT prophylaxis   · BP management - normotensive, with long-term goal <140/90  · Smoking cessation if applicable   · Diabetes education if applicable   · Depression Screening prior to discharge      Subjective: Interval history:    Patient is doing well. No focal deficits. MRI of brain negative for acute intracranial abnormality.      History:    Leonor Ritchie is a 78 y.o. male who is being seen for Code S. Review of systems negative with exception of pertinent positives and negatives noted above.        Objective:     Vitals:    01/04/20 0000 01/04/20 0400 01/04/20 0800 01/04/20 1200   BP: 125/76 138/77 129/77 124/69   Pulse: 77 76 74 76   Resp: 18 18 18 18   Temp: 98.1 °F (36.7 °C) 97.8 °F (36.6 °C) 98.5 °F (36.9 °C) 98.2 °F (36.8 °C)   SpO2: 93% 95% 94% 94%   Weight:       Height:              Current Facility-Administered Medications:     dextrose 5 % - 0.45% NaCl infusion, 75 mL/hr, IntraVENous, CONTINUOUS, All Carrillo MD, Last Rate: 75 mL/hr at 01/04/20 1200, 75 mL/hr at 01/04/20 1200    dutasteride (AVODART) capsule 0.5 mg, 0.5 mg, Oral, DAILY, Adonay Hoyt MD, 0.5 mg at 01/04/20 0747    sodium chloride (NS) flush 5-40 mL, 5-40 mL, IntraVENous, Q8H, Adonay Hoyt MD, 10 mL at 01/04/20 1145    sodium chloride (NS) flush 5-40 mL, 5-40 mL, IntraVENous, PRN, Adonay Hoyt MD    oxybutynin [facility]) tablet 5 mg, 5 mg, Oral, Q6H PRN, Adonay Hoyt MD    acetaminophen (TYLENOL) tablet 650 mg, 650 mg, Oral, Q4H PRN, Adonay Hoyt MD    HYDROcodone-acetaminophen (NORCO) 5-325 mg per tablet 1 Tab, 1 Tab, Oral, Q4H PRN, Adonay Hoyt MD, 1 Tab at 01/04/20 0747    HYDROmorphone (PF) (DILAUDID) injection 1 mg, 1 mg, IntraVENous, Q3H PRN, Adonay Hoyt MD, 1 mg at 01/04/20 0349    ondansetron (ZOFRAN) injection 4 mg, 4 mg, IntraVENous, Q4H PRN, Adonay Hoyt MD    docusate sodium (COLACE) capsule 100 mg, 100 mg, Oral, BID, Adonay Hoyt MD, 100 mg at 01/04/20 0090    Recent Results (from the past 12 hour(s))   METABOLIC PANEL, BASIC    Collection Time: 01/04/20  6:12 AM   Result Value Ref Range    Sodium 140 136 - 145 mmol/L    Potassium 3.7 3.5 - 5.1 mmol/L    Chloride 110 (H) 98 - 107 mmol/L    CO2 25 21 - 32 mmol/L    Anion gap 5 (L) 7 - 16 mmol/L    Glucose 162 (H) 65 - 100 mg/dL    BUN 17 8 - 23 MG/DL    Creatinine 1.28 0.8 - 1.5 MG/DL    GFR est AA >60 >60 ml/min/1.73m2    GFR est non-AA 58 (L) >60 ml/min/1.73m2    Calcium 8.2 (L) 8.3 - 10.4 MG/DL   CBC W/O DIFF    Collection Time: 01/04/20  6:12 AM   Result Value Ref Range    WBC 14.3 (H) 4.3 - 11.1 K/uL    RBC 3.40 (L) 4.23 - 5.6 M/uL    HGB 10.6 (L) 13.6 - 17.2 g/dL    HCT 30.3 (L) 41.1 - 50.3 %    MCV 89.1 79.6 - 97.8 FL    MCH 31.2 26.1 - 32.9 PG    MCHC 35.0 31.4 - 35.0 g/dL    RDW 14.0 11.9 - 14.6 %    PLATELET 289 (L) 545 - 450 K/uL    MPV 9.0 (L) 9.4 - 12.3 FL    ABSOLUTE NRBC 0.00 0.0 - 0.2 K/uL   MAGNESIUM    Collection Time: 01/04/20  6:12 AM   Result Value Ref Range    Magnesium 1.9 1.8 - 2.4 mg/dL     MRI Results (most recent):  Results from [place] encounter on 01/03/20   MRI BRAIN WO CONT    Narrative MRI brain without contrast:     History: Code stroke, dysarthria, aphasia. Symptoms occurred after open  prostatectomy earlier today. Imaging sequences: Sagittal short TR/short TE, axial short TR/short TE, long  TR/long TE, FLAIR, gradient recall, diffusion weighted images and ADC mapping. Coronal FLAIR. Imaging was performed on a 1.5 Zuly magnet. Comparison: None. Correlation is made to the CT scan of the brain and CT  perfusion performed earlier on the same day. Findings: The ventricles and sulci are mildly prominent but felt to be  appropriate for age. There are no extra-axial fluid collections. Normal flow  voids are present within all of the major intracranial vessels although there is  tortuosity of the vertebrobasilar system causing mild flattening along the  ventral aspect of the medulla. No evidence of intraparenchymal hemorrhage or  mass effect is identified. There are no areas of restricted diffusion to  suggest an acute or subacute infarction. Few scattered foci of T2 hyperintensity  within the supratentorial white matter are of doubtful clinical significance in  a patient of this age.  The visualized mastoid air cells and paranasal sinuses  are well pneumatized and aerated. Impression Impression:   1. No evidence of acute infarction. 2. Unremarkable unenhanced MRI of the brain for age. Physical Exam:  General - Well developed, well nourished, in no apparent distress. Pleasant and conversent. HEENT - Normocephalic, atraumatic. Conjunctiva are clear. Neck - Supple without masses  Cardiovascular - Regular rate and rhythm. Normal S1, S2 without murmurs, rubs, or gallops. Lungs - Clear to auscultation. Abdomen - Soft, nontender with normal bowel sounds. Extremities - Peripheral pulses intact. No edema and no rashes. Neurological examination - Comprehension, attention, memory and reasoning are intact. Language and speech are normal.   On cranial nerve examination, (II, III, IV, VI) pupils are equal, round, and reactive to light. Visual acuity is adequate. Visual fields are full to finger confrontation. Extraocular motility is normal. (V, VII) Face is symmetric and sensation is intact to light touch. (VIII) Hearing is intact. (IX, X) Palate and uvula elevate symmetrically. Voice is normal. (XI) Shoulder shrug is strong and equal bilaterally. (XII)Tongue is midline. Motor examination - There is normal muscle tone and bulk. Power is full throughout. Muscle stretch reflexes are normoactive and there are no pathological reflexes present. Plantar response is flexor. Sensation is intact to light touch, pinprick, vibration and proprioception in all extremities.  Cerebellar examination is normal. Gait and stance are normal.       Signed By: Criselda Gross NP     January 4, 2020

## 2020-01-04 NOTE — PROGRESS NOTES
01/04/20 0657   NIH Stroke Scale   Interval Other (comment)  (dual NIH with Pranay Mclaughlin RN)   LOC 0   LOC Questions 0   LOC Commands 0   Best Gaze 0   Visual 0   Facial Palsy 0   Motor Right Arm 0   Motor Left Arm 0   Motor Right Leg 0   Motor Left Leg 0   Limb Ataxia 0   Sensory 0   Best Language 0   Dysarthria 0   Extinction and Inattention 0   Total 0

## 2020-01-04 NOTE — OP NOTES
[facility]  OPERATIVE REPORT    Name:  Canelo Gonzalez  MR#:  [id number]  :  1940  ACCOUNT #:  [de-identified]  DATE OF SERVICE:  2020    PREOPERATIVE DIAGNOSIS:  Significant benign prostatic hyperplasia. POSTOPERATIVE DIAGNOSIS:  Significant benign prostatic hyperplasia. PROCEDURE PERFORMED:  Suprapubic prostatectomy. SURGEON:  Michael Ascencio MD    ASSISTANT:  Alexys Davalos MD    ANESTHESIA:  General.    COMPLICATIONS:  None    Please note that I assisted Dr. Cara Rios on this case and was present for the entire case.       Renetta Osorio MD      [id number]  D:  2020 16:08  T:  2020 2:49  JOB #:  [id number]

## 2020-01-05 LAB
CHOLEST SERPL-MCNC: 108 MG/DL
ERYTHROCYTE [DISTWIDTH] IN BLOOD BY AUTOMATED COUNT: 14.2 % (ref 11.9–14.6)
EST. AVERAGE GLUCOSE BLD GHB EST-MCNC: 103 MG/DL
HBA1C MFR BLD: 5.2 % (ref 4.8–6)
HCT VFR BLD AUTO: 27.5 % (ref 41.1–50.3)
HDLC SERPL-MCNC: 44 MG/DL (ref 40–60)
HDLC SERPL: 2.5 {RATIO}
HGB BLD-MCNC: 9.5 G/DL (ref 13.6–17.2)
LDLC SERPL CALC-MCNC: 46.6 MG/DL
LIPID PROFILE,FLP: NORMAL
MCH RBC QN AUTO: 31.5 PG (ref 26.1–32.9)
MCHC RBC AUTO-ENTMCNC: 34.5 G/DL (ref 31.4–35)
MCV RBC AUTO: 91.1 FL (ref 79.6–97.8)
NRBC # BLD: 0 K/UL (ref 0–0.2)
PLATELET # BLD AUTO: 125 K/UL (ref 150–450)
PMV BLD AUTO: 8.7 FL (ref 9.4–12.3)
RBC # BLD AUTO: 3.02 M/UL (ref 4.23–5.6)
TRIGL SERPL-MCNC: 87 MG/DL (ref 35–150)
VLDLC SERPL CALC-MCNC: 17.4 MG/DL (ref 6–23)
WBC # BLD AUTO: 9.1 K/UL (ref 4.3–11.1)

## 2020-01-05 PROCEDURE — 77030018836 HC SOL IRR NACL ICUM -A

## 2020-01-05 PROCEDURE — 74011250636 HC RX REV CODE- 250/636: Performed by: UROLOGY

## 2020-01-05 PROCEDURE — 36415 COLL VENOUS BLD VENIPUNCTURE: CPT

## 2020-01-05 PROCEDURE — 74011250637 HC RX REV CODE- 250/637: Performed by: UROLOGY

## 2020-01-05 PROCEDURE — 80061 LIPID PANEL: CPT

## 2020-01-05 PROCEDURE — 85027 COMPLETE CBC AUTOMATED: CPT

## 2020-01-05 PROCEDURE — 77030020253 HC SOL INJ D545NS .05 DEX .45 SAL

## 2020-01-05 PROCEDURE — 83036 HEMOGLOBIN GLYCOSYLATED A1C: CPT

## 2020-01-05 PROCEDURE — 74011000258 HC RX REV CODE- 258: Performed by: UROLOGY

## 2020-01-05 PROCEDURE — 65660000000 HC RM CCU STEPDOWN

## 2020-01-05 RX ADMIN — DOCUSATE SODIUM 100 MG: 100 CAPSULE, LIQUID FILLED ORAL at 17:49

## 2020-01-05 RX ADMIN — DOCUSATE SODIUM 100 MG: 100 CAPSULE, LIQUID FILLED ORAL at 08:02

## 2020-01-05 RX ADMIN — HYDROMORPHONE HYDROCHLORIDE 1 MG: 1 INJECTION, SOLUTION INTRAMUSCULAR; INTRAVENOUS; SUBCUTANEOUS at 13:47

## 2020-01-05 RX ADMIN — HYDROMORPHONE HYDROCHLORIDE 1 MG: 1 INJECTION, SOLUTION INTRAMUSCULAR; INTRAVENOUS; SUBCUTANEOUS at 04:23

## 2020-01-05 RX ADMIN — HYDROCODONE BITARTRATE AND ACETAMINOPHEN 1 TABLET: 5; 325 TABLET ORAL at 19:57

## 2020-01-05 RX ADMIN — Medication 5 ML: at 04:21

## 2020-01-05 RX ADMIN — HYDROMORPHONE HYDROCHLORIDE 1 MG: 1 INJECTION, SOLUTION INTRAMUSCULAR; INTRAVENOUS; SUBCUTANEOUS at 18:57

## 2020-01-05 RX ADMIN — Medication 5 ML: at 19:58

## 2020-01-05 RX ADMIN — DEXTROSE MONOHYDRATE AND SODIUM CHLORIDE 75 ML/HR: 5; .45 INJECTION, SOLUTION INTRAVENOUS at 08:03

## 2020-01-05 RX ADMIN — DUTASTERIDE 0.5 MG: 0.5 CAPSULE, LIQUID FILLED ORAL at 08:02

## 2020-01-05 RX ADMIN — Medication 5 ML: at 13:47

## 2020-01-05 NOTE — PROGRESS NOTES
Problem: Falls - Risk of  Goal: *Absence of Falls  Description  Document Tati Galicia Fall Risk and appropriate interventions in the flowsheet.   Outcome: Progressing Towards Goal  Note: Fall Risk Interventions:  Mobility Interventions: Bed/chair exit alarm, OT consult for ADLs, Patient to call before getting OOB, PT Consult for mobility concerns         Medication Interventions: Bed/chair exit alarm, Evaluate medications/consider consulting pharmacy, Patient to call before getting OOB, Teach patient to arise slowly    Elimination Interventions: Call light in reach, Bed/chair exit alarm, Patient to call for help with toileting needs, Stay With Me (per policy), Urinal in reach              Problem: Patient Education: Go to Patient Education Activity  Goal: Patient/Family Education  Outcome: Progressing Towards Goal     Problem: TIA/CVA Stroke: Day 2 Until Discharge  Goal: Activity/Safety  Outcome: Progressing Towards Goal  Goal: Diagnostic Test/Procedures  Outcome: Progressing Towards Goal  Goal: Nutrition/Diet  Outcome: Progressing Towards Goal  Goal: Discharge Planning  Outcome: Progressing Towards Goal  Goal: Medications  Outcome: Progressing Towards Goal  Goal: Respiratory  Outcome: Progressing Towards Goal  Goal: Treatments/Interventions/Procedures  Outcome: Progressing Towards Goal  Goal: Psychosocial  Outcome: Progressing Towards Goal  Goal: *Verbalizes anxiety and depression are reduced or absent  Outcome: Progressing Towards Goal  Goal: *Absence of aspiration  Outcome: Progressing Towards Goal  Goal: *Absence of deep venous thrombosis signs and symptoms(Stroke Metric)  Outcome: Progressing Towards Goal  Goal: *Optimal pain control at patient's stated goal  Outcome: Progressing Towards Goal  Goal: *Tolerating diet  Outcome: Progressing Towards Goal  Goal: *Ability to perform ADLs and demonstrates progressive mobility and function  Outcome: Progressing Towards Goal  Goal: *Stroke education continued(Stroke Metric)  Outcome: Progressing Towards Goal

## 2020-01-05 NOTE — PROGRESS NOTES
01/05/20 0724   NIH Stroke Scale   Interval Other (comment)  (Dual NIH with TUNG Reyes)   LOC 0   LOC Questions 0   LOC Commands 0   Best Gaze 0   Visual 0   Facial Palsy 0   Motor Right Arm 0   Motor Left Arm 0   Motor Right Leg 0   Motor Left Leg 0   Limb Ataxia 0   Sensory 0   Best Language 0   Dysarthria 0   Extinction and Inattention 0   Total 0

## 2020-01-05 NOTE — PROGRESS NOTES
Patient is feeling better. He has a reasonable p.o. intake. There have been no episodes of nausea or vomiting. He is having flatus. On exam he is afebrile stable w vitithal signs and quite alert today. The abdomen is benign and his wound is clean and dry. Urine is slightly bloody tinged on CBI. I will discontinue his IV fluid. Continue current care.

## 2020-01-06 LAB
ABO + RH BLD: NORMAL
BLD PROD TYP BPU: NORMAL
BLD PROD TYP BPU: NORMAL
BLOOD GROUP ANTIBODIES SERPL: NORMAL
BPU ID: NORMAL
BPU ID: NORMAL
CROSSMATCH RESULT,%XM: NORMAL
CROSSMATCH RESULT,%XM: NORMAL
ERYTHROCYTE [DISTWIDTH] IN BLOOD BY AUTOMATED COUNT: 13.9 % (ref 11.9–14.6)
GLUCOSE BLD STRIP.AUTO-MCNC: 179 MG/DL (ref 65–100)
HCT VFR BLD AUTO: 27.9 % (ref 41.1–50.3)
HGB BLD-MCNC: 9.6 G/DL (ref 13.6–17.2)
MCH RBC QN AUTO: 31.6 PG (ref 26.1–32.9)
MCHC RBC AUTO-ENTMCNC: 34.4 G/DL (ref 31.4–35)
MCV RBC AUTO: 91.8 FL (ref 79.6–97.8)
NRBC # BLD: 0 K/UL (ref 0–0.2)
PLATELET # BLD AUTO: 141 K/UL (ref 150–450)
PMV BLD AUTO: 8.9 FL (ref 9.4–12.3)
RBC # BLD AUTO: 3.04 M/UL (ref 4.23–5.6)
SPECIMEN EXP DATE BLD: NORMAL
STATUS OF UNIT,%ST: NORMAL
STATUS OF UNIT,%ST: NORMAL
UNIT DIVISION, %UDIV: 0
UNIT DIVISION, %UDIV: 0
WBC # BLD AUTO: 6.1 K/UL (ref 4.3–11.1)

## 2020-01-06 PROCEDURE — 36415 COLL VENOUS BLD VENIPUNCTURE: CPT

## 2020-01-06 PROCEDURE — 74011250637 HC RX REV CODE- 250/637: Performed by: UROLOGY

## 2020-01-06 PROCEDURE — 97530 THERAPEUTIC ACTIVITIES: CPT

## 2020-01-06 PROCEDURE — 77030018836 HC SOL IRR NACL ICUM -A

## 2020-01-06 PROCEDURE — 65660000000 HC RM CCU STEPDOWN

## 2020-01-06 PROCEDURE — 97165 OT EVAL LOW COMPLEX 30 MIN: CPT

## 2020-01-06 PROCEDURE — 85027 COMPLETE CBC AUTOMATED: CPT

## 2020-01-06 PROCEDURE — 92526 ORAL FUNCTION THERAPY: CPT

## 2020-01-06 RX ADMIN — HYDROCODONE BITARTRATE AND ACETAMINOPHEN 1 TABLET: 5; 325 TABLET ORAL at 06:01

## 2020-01-06 RX ADMIN — DOCUSATE SODIUM 100 MG: 100 CAPSULE, LIQUID FILLED ORAL at 09:07

## 2020-01-06 RX ADMIN — Medication 5 ML: at 06:01

## 2020-01-06 RX ADMIN — Medication 10 ML: at 14:46

## 2020-01-06 RX ADMIN — Medication 15 ML: at 22:19

## 2020-01-06 RX ADMIN — HYDROCODONE BITARTRATE AND ACETAMINOPHEN 1 TABLET: 5; 325 TABLET ORAL at 14:46

## 2020-01-06 RX ADMIN — DUTASTERIDE 0.5 MG: 0.5 CAPSULE, LIQUID FILLED ORAL at 09:07

## 2020-01-06 RX ADMIN — DOCUSATE SODIUM 100 MG: 100 CAPSULE, LIQUID FILLED ORAL at 17:10

## 2020-01-06 RX ADMIN — HYDROCODONE BITARTRATE AND ACETAMINOPHEN 1 TABLET: 5; 325 TABLET ORAL at 22:22

## 2020-01-06 NOTE — PROGRESS NOTES
Problem: Mobility Impaired (Adult and Pediatric)  Goal: *Acute Goals and Plan of Care (Insert Text)  Description  LTG:  (1.)Mr. Renato Mendoza will move from supine to sit and sit to supine , scoot up and down and roll side to side with INDEPENDENT within 7 treatment day(s) through log rolling.    (2.)Mr. Renato Mendoza will transfer from bed to chair and chair to bed with SUPERVISION using the least restrictive device within 7 treatment day(s). (3.)Mr. Renato Mendoza will ambulate with SUPERVISION for 500+ feet with the least restrictive device within 7 treatment day(s) while maintaining normal vital signs. (4.)Mr. Renato Mendoza will perform 2 steps with CGA for safety ascending and descending stairs for home within 7 treatment days. _____________________________________________________________________________________________   Outcome: Progressing Towards Goal     PHYSICAL THERAPY: Daily Note and PM 1/6/2020  INPATIENT: PT Visit Days : 2  Payor: Kaleb Kessler / Plan: [id number] PPO/PFFS / Product Type: [employer] /       NAME/AGE/GENDER: Constancia Alpers is a 78 y.o. male   PRIMARY DIAGNOSIS: Benign prostatic hyperplasia, unspecified whether lower urinary tract symptoms present [N40.0]  Bladder stone [N21.0]  Bladder stone [N21.0]  BPH with obstruction/lower urinary tract symptoms [N40.1, N13.8] <principal problem not specified> <principal problem not specified>  Procedure(s) (LRB):  OPEN SIMPLE PROSTATECTOMY (N/A)  WITH BLADDER STONE REMOVAL (N/A)  3 Days Post-Op  ICD-10: Treatment Diagnosis:    · Difficulty in walking, Not elsewhere classified (R26.2)   Precaution/Allergies:  Cefdinir and Sulfa (sulfonamide antibiotics)      ASSESSMENT:     Mr. Renato Mendoza presents with decreased bed mobility, transfers, ambulation, balance, activity tolerance and overall general functional mobility s/p hospital admission with above surgery on 1/3/20.  Pt with code S called 1/3 after surgery for slurred speech, aphasia, CVA workup negative for stoke. Pt spouse and daughter present during assessment. Family reports pt back at baseline for speech and mentation. Pt reports lives in 2 story home on main level, independent at baseline for ambulation, ADLs, drives. Pt states no falls at home. Pt sitting up in chair from OT session. With a little encouragement he agreed to participate . Pt stood and donned a robe with assistance. He ambulated with RW into hallway for 250', SBA, slowly and guardedly. He sat on EOB once back in his room. He declined ex at this time 2° fatigue and some soreness at surgery site. He was left with wife present and needs in reach. PT to cont to follow for acute care needs. This section established at most recent assessment   PROBLEM LIST (Impairments causing functional limitations):  1. Decreased ADL/Functional Activities  2. Decreased Transfer Abilities  3. Decreased Ambulation Ability/Technique  4. Decreased Balance  5. Increased Pain  6. Decreased Activity Tolerance   INTERVENTIONS PLANNED: (Benefits and precautions of physical therapy have been discussed with the patient.)  1. Balance Exercise  2. Bed Mobility  3. Family Education  4. Gait Training  5. Home Exercise Program (HEP)  6. Therapeutic Activites  7. Therapeutic Exercise/Strengthening  8. Transfer Training     TREATMENT PLAN: Frequency/Duration: 3 times a week for duration of hospital stay  Rehabilitation Potential For Stated Goals: Good     REHAB RECOMMENDATIONS (at time of discharge pending progress):    Placement: It is my opinion, based on this patient's performance to date, that Mr. Ron Morton may benefit from [address] after discharge due to the functional deficits listed above that are likely to improve with skilled rehabilitation because he/she has an inability to tolerate transportation to an outpatient setting as evidenced by fatigue and because low endurance. .   Equipment:   Ritesh Velasquez, Type: Rolling Walker  May try to borrow one              HISTORY:   History of Present Injury/Illness (Reason for Referral):  Rob Batista is a 78 y.o. male followed secondary to an elevated PSA, BPH, and + family history of prostate cancer (father and identical twin brother). He was previously followed by Dr. Chloe Keith. He is taking avodart since 6/09. He underwent prostate biopsy 5/02 secondary to psa of 3.4. 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 Repeat prostate biopsy 8/02 revealed all benign cores. LUTS have improved on avodart. In regards to ED, he uses 100mg viagra with good results. He had an episode of gross hematuria. 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 CT 5/23/14 revealed pelvic mass to be a VERY LARGE prostate (180 gr by estimation) with indentation into the bladder. Cystoscopy 5/14 revealed the enlarged prostate and no bladder pathology. He returned in 6/17 with 6 month history of gross hematuria 2-3 times per week. It could be heavy at times. LUTS are well controlled on his avodart. He stopped 81 mg asa in 6/17 and gross hematuria has decreased substantially. We discussed repeating cystoscopy and he wanted to hold off for now since we have done that as recently as '15. He understands a very low risk of missing a bladder cancer, although bleeding is much more likely from a prostatic source. He is continuing to have gross hematuria, light, with exercise. He has also passed a few stones and had 2 UTI's in the last year.     Past Medical History/Comorbidities:   Mr. Raul Edwards  has a past medical history of Arthritis, BPH (benign prostatic hyperplasia), Calculus of ureter, Elevated prostate specific antigen (PSA), Family history of malignant neoplasm of prostate, First degree AV block, Hematospermia, Hematuria, microscopic, Hypertension, Hypertrophy of prostate without urinary obstruction and other lower urinary tract symptoms (LUTS), Ill-defined condition, Impotence of organic origin, Incomplete right bundle branch block, and Renal colic. Mr. Alfonso Wolf  has a past surgical history that includes biopsy prostate; hx colonoscopy (2016); and hx hernia repair (AS CHILD). Social History/Living Environment:   Home Environment: Private residence  One/Two Story Residence: Two story, live on 1st floor  Living Alone: No  Support Systems: Spouse/Significant Other/Partner  Patient Expects to be Discharged to[de-identified] Private residence  Current DME Used/Available at Home: None  Prior Level of Function/Work/Activity:  Lives with spouse, independent at baseline, drives  Personal Factors:          Sex:  male        Age:  78 y.o. Overall Behavior:  agreeable   Number of Personal Factors/Comorbidities that affect the Plan of Care:  Age, HTN, TIA? 3+: HIGH COMPLEXITY   EXAMINATION:   Most Recent Physical Functioning:   Gross Assessment:                  Posture:     Balance:  Sitting: Intact  Standing - Static: Good  Standing - Dynamic : Fair Bed Mobility:  Rolling: Contact guard assistance  Sit to Supine: Minimum assistance  Wheelchair Mobility:     Transfers:  Sit to Stand: Stand-by assistance  Stand to Sit: Stand-by assistance  Gait:     Base of Support: Widened  Speed/Lori: Slow  Step Length: Left shortened;Right shortened  Gait Abnormalities: Decreased step clearance  Distance (ft): 250 Feet (ft)  Assistive Device: Walker, rolling  Ambulation - Level of Assistance: Stand-by assistance  Interventions: Safety awareness training;Verbal cues      Body Structures Involved:  1. Muscles Body Functions Affected:  1. Movement Related Activities and Participation Affected:  1. General Tasks and Demands  2. Mobility  3.  Self Care   Number of elements that affect the Plan of Care: 4+: HIGH COMPLEXITY   CLINICAL PRESENTATION:   Presentation: Evolving clinical presentation with changing clinical characteristics: MODERATE COMPLEXITY   CLINICAL DECISION MAKING:   [facility] Inpatient Short Form  How much difficulty does the patient currently have. .. Unable A Lot A Little None   1. Turning over in bed (including adjusting bedclothes, sheets and blankets)? [] 1   [] 2   [x] 3   [] 4   2. Sitting down on and standing up from a chair with arms ( e.g., wheelchair, bedside commode, etc.)   [] 1   [] 2   [x] 3   [] 4   3. Moving from lying on back to sitting on the side of the bed? [] 1   [] 2   [x] 3   [] 4   How much help from another person does the patient currently need. .. Total A Lot A Little None   4. Moving to and from a bed to a chair (including a wheelchair)? [] 1   [] 2   [x] 3   [] 4   5. Need to walk in hospital room? [] 1   [] 2   [x] 3   [] 4   6. Climbing 3-5 steps with a railing? [] 1   [] 2   [x] 3   [] 4   © 2007, Trustees of [address] [place] [address], under license to [employer]. All rights reserved      Score:  Initial: 18 Most Recent: X (Date: -- )    Interpretation of Tool:  Represents activities that are increasingly more difficult (i.e. Bed mobility, Transfers, Gait). Medical Necessity:     · Patient is expected to demonstrate progress in   · strength, range of motion, balance, and coordination  ·  to   · decrease assistance required with overall functional mobility, transfers, and ambulation   · .  · Patient demonstrates   · good  ·  rehab potential due to higher previous functional level. Reason for Services/Other Comments:  · Patient   · continues to require present interventions due to patient's inability to perform bed mobility, transfers, ambulation safely and effectively   · . Use of outcome tool(s) and clinical judgement create a POC that gives a: Clear prediction of patient's progress: LOW COMPLEXITY            TREATMENT:   (In addition to Assessment/Re-Assessment sessions the following treatments were rendered)   Pre-treatment Symptoms/Complaints: \"Its not really pain. Just soreness.  \"  Pain: Initial:      Post Session:     Therapeutic Activity: (    24 min): Therapeutic activities including Bed transfers, Chair transfers, Ambulation on level ground, and walker safety, posture to improve mobility, strength, balance, and coordination. Required minimal Safety awareness training;Verbal cues to promote static and dynamic balance in standing and promote coordination of bilateral, lower extremity(s). Braces/Orthotics/Lines/Etc:   · IV  · O2 Device: Room air  Treatment/Session Assessment:    · Response to Treatment:  tolerated well  · Interdisciplinary Collaboration:   o Physical Therapy Assistant  o Registered Nurse  · After treatment position/precautions:   o Supine in bed  o Bed/Chair-wheels locked  o Bed in low position  o Call light within reach  o RN notified  o Family at bedside   · Compliance with Program/Exercises: Will assess as treatment progresses  · Recommendations/Intent for next treatment session: \"Next visit will focus on advancements to more challenging activities\".   Total Treatment Duration:  PT Patient Time In/Time Out  Time In: 1455  Time Out: [address], LUCAS

## 2020-01-06 NOTE — PROGRESS NOTES
Multiple attempts have been made today to see patient secondary to diagnosis of TIA/CVA. No CM consult was received, however,diagnosis was recognized this AM with review of the chart. Patient has been with PT/OT or receiving nursing care on each attempt. Wife seen in the hallway, but she would like them both to participate in the meeting with CM. CM will follow up tomorrow.

## 2020-01-06 NOTE — PROGRESS NOTES
Admit Date: 1/3/2020    Subjective:     Neena Griffith is doing well, no complaints. CBI continues at a slow drip, urine is light pink and clear. Pt continues to tolerate regular food, passing flatus. No events overnight. Objective:     Patient Vitals for the past 8 hrs:   BP Temp Pulse Resp SpO2   01/06/20 0400 161/88 98.3 °F (36.8 °C) 67 20 90 %   01/06/20 0000 119/71 99 °F (37.2 °C) 69 20 90 %     No intake/output data recorded. 01/04 1901 - 01/06 0700  In: 83544   Out: 11131     Physical Exam:  GENERAL: alert, cooperative, no distress  LUNG: clear to auscultation bilaterally  HEART: regular rate and rhythm, S1, S2   ABDOMEN: soft, non-tender, slightly distended, hypoactive BS, +flatus, staples c/d/i  NEUROLOGIC: AOx3      Data Review No results found for this or any previous visit (from the past 24 hour(s)). Assessment:     Active Problems:    Bladder stone (1/3/2020)      BPH with obstruction/lower urinary tract symptoms (1/3/2020)      POD 3:    POSTOPERATIVE DIAGNOSES:  1.  Bladder stone. 2.  Benign prostatic hyperplasia. 3.  Recurrent urinary tract infections.     PROCEDURE PERFORMED:  1. Cystotomy with stone extraction. 2.  Open simple prostatectomy.     Afebrile, VSS    CBC pending. Plan:     Continue CBI. Titrate drip to keep clear. Ambulate pt. Tory Patel NP  [facility]    I have reviewed the above note and examined the patient. I agree with the exam, assessment and plan. Kitty Allen.  Ced Cheung MD

## 2020-01-06 NOTE — PROGRESS NOTES
Problem: Falls - Risk of  Goal: *Absence of Falls  Description  Document Tati Galicia Fall Risk and appropriate interventions in the flowsheet.   Outcome: Progressing Towards Goal  Note: Fall Risk Interventions:  Mobility Interventions: Bed/chair exit alarm, OT consult for ADLs, Patient to call before getting OOB, PT Consult for mobility concerns         Medication Interventions: Bed/chair exit alarm, Teach patient to arise slowly, Patient to call before getting OOB, Evaluate medications/consider consulting pharmacy    Elimination Interventions: Call light in reach, Bed/chair exit alarm, Patient to call for help with toileting needs, Stay With Me (per policy)              Problem: Patient Education: Go to Patient Education Activity  Goal: Patient/Family Education  Outcome: Progressing Towards Goal     Problem: Patient Education: Go to Patient Education Activity  Goal: Patient/Family Education  Outcome: Progressing Towards Goal     Problem: TIA/CVA Stroke: Day 2 Until Discharge  Goal: Activity/Safety  Outcome: Progressing Towards Goal  Goal: Diagnostic Test/Procedures  Outcome: Progressing Towards Goal  Goal: Nutrition/Diet  Outcome: Progressing Towards Goal  Goal: Discharge Planning  Outcome: Progressing Towards Goal  Goal: Medications  Outcome: Progressing Towards Goal  Goal: Respiratory  Outcome: Progressing Towards Goal  Goal: Treatments/Interventions/Procedures  Outcome: Progressing Towards Goal  Goal: Psychosocial  Outcome: Progressing Towards Goal  Goal: *Verbalizes anxiety and depression are reduced or absent  Outcome: Progressing Towards Goal  Goal: *Absence of aspiration  Outcome: Progressing Towards Goal  Goal: *Absence of deep venous thrombosis signs and symptoms(Stroke Metric)  Outcome: Progressing Towards Goal  Goal: *Optimal pain control at patient's stated goal  Outcome: Progressing Towards Goal  Goal: *Tolerating diet  Outcome: Progressing Towards Goal  Goal: *Ability to perform ADLs and demonstrates progressive mobility and function  Outcome: Progressing Towards Goal  Goal: *Stroke education continued(Stroke Metric)  Outcome: Progressing Towards Goal

## 2020-01-06 NOTE — PROGRESS NOTES
Problem: Self Care Deficits Care Plan (Adult)  Goal: *Acute Goals and Plan of Care (Insert Text)  Description  1. Patient will complete bed mobility with stand by assistance and adaptive equipment as needed. 2. Patient will tolerate 8 minutes of OT treatment with up to 0 rest breaks to increase activity tolerance for ADLs. 3. Patient will complete functional transfers with stand by assistance and adaptive equipment as needed. 4. Patient will complete functional mobility for ADLs with stand by assistance and adaptive equipment as needed. Timeframe: 1 visit    GOALS MET 1/6/2020      Outcome: Resolved/Met    OCCUPATIONAL THERAPY: Initial Assessment, Daily Note, Discharge, and PM 1/6/2020  INPATIENT: OT Visit Days: 1  Payor: [place] Caina / Plan: [employer] PPO/PFFS / Product Type: Managed Care Medicare /      NAME/AGE/GENDER: Simin Cifuentes is a 78 y.o. male   PRIMARY DIAGNOSIS:  Benign prostatic hyperplasia, unspecified whether lower urinary tract symptoms present [N40.0]  Bladder stone [N21.0]  Bladder stone [N21.0]  BPH with obstruction/lower urinary tract symptoms [N40.1, N13.8] <principal problem not specified> <principal problem not specified>  Procedure(s) (LRB):  OPEN SIMPLE PROSTATECTOMY (N/A)  WITH BLADDER STONE REMOVAL (N/A)  3 Days Post-Op  ICD-10: Treatment Diagnosis:    · Difficulty in walking, Not elsewhere classified (R26.2)   Precautions/Allergies:     Cefdinir and Sulfa (sulfonamide antibiotics)      ASSESSMENT:     Mr. Terri Muñoz is a  78 y.o. male admitted with the above, s/p the above procedures. At baseline pt lives with spouse and reports independence with ADLs, IADLs, and ambulation. No hx of falls. Upon arrival pt is alert and agreeable to OT evaluation. General UE screen revealed AROM and strength WFL in BUEs. Pt completes bed mobility with SBA/additional time, pt moves slowly due to post op pain. Sitting balance intact.  Pt practiced functional transfers with SBA and ambulation with RW/CGA progressing to RW/SBA. Pt and wife educated on lower body bathing/dressing compensatory techniques to minimize post op pain and maximize independence. Pt and wife verbalized understanding. Pt educated on importance of staying out of bed during hospital stay. Pt left seated in chair with call bell within reach. Pt with decreased ADL independence at this time due to post op pain, will likely resolve as pt heals, wife will assist with ADLs until then. Defer to PT for decreased activity tolerance. No indication for further skilled OT at this time. Will d/c. This section established at most recent assessment   PROBLEM LIST (Impairments causing functional limitations):  1. Decreased ADL/Functional Activities  2. Decreased Transfer Abilities  3. Decreased Ambulation Ability/Technique  4. Increased Pain  5. Decreased Activity Tolerance   INTERVENTIONS PLANNED: (Benefits and precautions of occupational therapy have been discussed with the patient.)  1. Activities of daily living training  2. Clothing management  3. Donning&doffing training  4. Therapeutic activity     TREATMENT PLAN: Frequency/Duration: Follow patient x1 visit to address above goals. Rehabilitation Potential For Stated Goals: Excellent     REHAB RECOMMENDATIONS (at time of discharge pending progress):    Placement: It is my opinion, based on this patient's performance to date, that Mr. Sona Cho may benefit from being discharged with NO further skilled therapy due to the high likelihood of returning to baseline. Equipment:    None at this time              OCCUPATIONAL PROFILE AND HISTORY:   History of Present Injury/Illness (Reason for Referral):  See H&P.    Past Medical History/Comorbidities:   Mr. Sona Cho  has a past medical history of Arthritis, BPH (benign prostatic hyperplasia), Calculus of ureter, Elevated prostate specific antigen (PSA), Family history of malignant neoplasm of prostate, First degree AV block, Hematospermia, Hematuria, microscopic, Hypertension, Hypertrophy of prostate without urinary obstruction and other lower urinary tract symptoms (LUTS), Ill-defined condition, Impotence of organic origin, Incomplete right bundle branch block, and Renal colic. Mr. Flakita Samaniego  has a past surgical history that includes biopsy prostate; hx colonoscopy (2016); and hx hernia repair (AS CHILD). Social History/Living Environment:   Home Environment: Private residence  One/Two Story Residence: Two story  Living Alone: No  Support Systems: Spouse/Significant Other/Partner  Patient Expects to be Discharged to[de-identified] Private residence  Current DME Used/Available at Home: Shower chair  Tub or Shower Type: Shower  Prior Level of Function/Work/Activity:  Independent  Personal Factors:          Sex:  male        Age:  78 y.o. Other factors that influence how disability is experienced by the patient:  Multiple co-morbidities    Number of Personal Factors/Comorbidities that affect the Plan of Care: Expanded review of therapy/medical records (1-2):  MODERATE COMPLEXITY   ASSESSMENT OF OCCUPATIONAL PERFORMANCE[de-identified]   Activities of Daily Living:   Basic ADLs (From Assessment) Complex ADLs (From Assessment)   Feeding: Independent  Oral Facial Hygiene/Grooming: Independent  Bathing: Minimum assistance  Upper Body Dressing: Independent  Lower Body Dressing: Maximum assistance  Toileting: Minimum assistance     Grooming/Bathing/Dressing Activities of Daily Living     Cognitive Retraining  Safety/Judgement: Awareness of environment; Insight into deficits; Fall prevention;Home safety                       Bed/Mat Mobility  Rolling: Contact guard assistance  Supine to Sit: Stand-by assistance; Additional time  Sit to Supine: Minimum assistance  Sit to Stand: Stand-by assistance  Stand to Sit: Stand-by assistance  Bed to Chair: Contact guard assistance;Stand-by assistance  Scooting: Stand-by assistance     Most Recent Physical Functioning:   Gross Assessment:  AROM: Within functional limits(BUEs)  Strength: Within functional limits(BUEs)  Coordination: Within functional limits(BUEs)  Sensation: Intact(BUEs to light touch)               Posture:  Posture (WDL): Within defined limits  Balance:  Sitting: Intact  Standing: Intact; With support  Standing - Static: Good  Standing - Dynamic : Fair Bed Mobility:  Rolling: Contact guard assistance  Supine to Sit: Stand-by assistance; Additional time  Sit to Supine: Minimum assistance  Scooting: Stand-by assistance  Wheelchair Mobility:     Transfers:  Sit to Stand: Stand-by assistance  Stand to Sit: Stand-by assistance  Bed to Chair: Contact guard assistance;Stand-by assistance            Patient Vitals for the past 6 hrs:   BP BP Patient Position SpO2 Pulse   01/06/20 1200 129/84 At rest 93 % 66   01/06/20 1600 132/77 At rest 91 % 68       Mental Status  Neurologic State: Alert  Orientation Level: Oriented X4  Cognition: Appropriate decision making, Appropriate for age attention/concentration, Appropriate safety awareness, Follows commands  Perception: Appears intact  Perseveration: No perseveration noted  Safety/Judgement: Awareness of environment, Insight into deficits, Fall prevention, Home safety                          Physical Skills Involved:  1. Pain (acute) Cognitive Skills Affected (resulting in the inability to perform in a timely and safe manner):  1. None  Psychosocial Skills Affected:  1. Habits/Routines  2. Environmental Adaptation  3. Social Interaction  4. Emotional Regulation  5. Self-Awareness  6. Awareness of Others  7. Social Roles   Number of elements that affect the Plan of Care: 5+:  HIGH COMPLEXITY   CLINICAL DECISION MAKING:   [employer] AM-PAC 6 Clicks   Daily Activity Inpatient Short Form  How much help from another person does the patient currently need. .. Total A Lot A Little None   1. Putting on and taking off regular lower body clothing? [] 1   [x] 2   [] 3   [] 4   2. Bathing (including washing, rinsing, drying)? [] 1   [] 2   [x] 3   [] 4   3. Toileting, which includes using toilet, bedpan or urinal?   [] 1   [] 2   [x] 3   [] 4   4. Putting on and taking off regular upper body clothing? [] 1   [] 2   [] 3   [x] 4   5. Taking care of personal grooming such as brushing teeth? [] 1   [] 2   [] 3   [x] 4   6. Eating meals? [] 1   [] 2   [] 3   [x] 4   © 2007, Trustees of [address] [place] [address] [address] [address], under license to "[employer]. All rights reserved      Score:  Initial: 20 1/6/2020 Most Recent: X (Date: -- )    Interpretation of Tool:  Represents activities that are increasingly more difficult (i.e. Bed mobility, Transfers, Gait). Medical Necessity:     · Patient demonstrates   · good  ·  rehab potential due to higher previous functional level. Reason for Services/Other Comments:  · Patient required skilled intervention due to   · Inability to complete ADLs at prior level of independence   · . Use of outcome tool(s) and clinical judgement create a POC that gives a: LOW COMPLEXITY         TREATMENT:   (In addition to Assessment/Re-Assessment sessions the following treatments were rendered)     Pre-treatment Symptoms/Complaints:    Pain: Initial:   Pain Intensity 1: 5  Post Session:  same     Therapeutic Activity: (    8 minutes): Therapeutic activities including Chair transfers, Ambulation on level ground, and RW management to improve mobility and coordination. Educated on compensatory techniques for lower body ADLs, verbalized understanding. Required minimal cueing Safety awareness training;Verbal cues to promote dynamic balance in standing and dressing/bathing strategy. Pt practiced functional transfers with SBA and ambulation with RW/CGA progressing to RW/SBA. Pt and wife educated on lower body bathing/dressing compensatory techniques to minimize post op pain and maximize independence. Pt and wife verbalized understanding.      Braces/Orthotics/Lines/Etc:   · ryan catheter  · O2 Device: Room air  Treatment/Session Assessment:    · Response to Treatment:  Tolerated well   · Interdisciplinary Collaboration:   o Occupational Therapist  o Registered Nurse  · After treatment position/precautions:   o Up in chair  o Bed/Chair-wheels locked  o Bed in low position  o Call light within reach  o RN notified  o Family at bedside   · Compliance with Program/Exercises: NA.  · Recommendations/Intent for next treatment session: \"Next visit will focus on NA\".   Total Treatment Duration:  OT Patient Time In/Time Out  Time In: 1417  Time Out: [address] [facility], OTR/L

## 2020-01-06 NOTE — PROGRESS NOTES
Problem: Dysphagia (Adult)  Goal: *Acute Goals and Plan of Care (Insert Text)  Description  STG: Pt will demonstrate zero signs/sx of aspiration with thin liquids with 90% accuracy during all meals. MET 1/6/2020  LTG: Pt will demonstrate zero signs/sx of aspiration with least restrictive diet with 100% accuracy during all meals. MET 1/6/2020    SPEECH LANGUAGE PATHOLOGY: DYSPHAGIA- Initial Assessment and Discharge    NAME/AGE/GENDER: Heladio Kumari is a 78 y.o. male  DATE: 1/6/2020  PRIMARY DIAGNOSIS: Benign prostatic hyperplasia, unspecified whether lower urinary tract symptoms present [N40.0]  Bladder stone [N21.0]  Bladder stone [N21.0]  BPH with obstruction/lower urinary tract symptoms [N40.1, N13.8]  Procedure(s) (LRB):  OPEN SIMPLE PROSTATECTOMY (N/A)  WITH BLADDER STONE REMOVAL (N/A) 3 Days Post-Op  ICD-10: Treatment Diagnosis: R13.12 Dysphagia, Oropharyngeal Phase    INTERDISCIPLINARY COLLABORATION: Registered Nurse  PRECAUTIONS/ALLERGIES: Cefdinir and Sulfa (sulfonamide antibiotics)       SUBJECTIVE   Alert. About to eat lunch. History of Present Injury/Illness: Mr. Jemal Tobar  has a past medical history of Arthritis, BPH (benign prostatic hyperplasia), Calculus of ureter, Elevated prostate specific antigen (PSA), Family history of malignant neoplasm of prostate, First degree AV block, Hematospermia, Hematuria, microscopic, Hypertension, Hypertrophy of prostate without urinary obstruction and other lower urinary tract symptoms (LUTS), Ill-defined condition, Impotence of organic origin, Incomplete right bundle branch block, and Renal colic. Yfn Barron He also  has a past surgical history that includes biopsy prostate; hx colonoscopy (2016); and hx hernia repair (AS CHILD).       Problem List:  (Impairments causing functional limitations):  1. dysphagia   Previous Dysphagia: NONE REPORTED    Diet Prior to Evaluation: regular diet/thin liquids       Orientation:   Person  Place  Time  Situation    Pain: Pain Scale 1: Numeric (0 - 10)  Pain Intensity 1: 5  Pain Location 1: Abdomen  Pain Intervention(s) 1: Emotional support         OBJECTIVE   Oral Motor:   · Labial: No impairment  · Dentition: Natural  · Oral Hygiene: Adequate  · Lingual: No impairment     Seen for diet tolerance. Patient self fed thin liquids via straw and portions of regular diet meal tray. Throat clearing before and inconsistently throughout, which did not appear related to po intake. Vocal quality remained clear. Single swallows per bite/sip upon palpation. ASSESSMENT   Patient presents with functional oropharyngeal swallow. Recommend continue regular diet/thin liquids. Medications with liquid rinse. No further dysphagia treatment indicated. Tool Used: Dysphagia Outcome and Severity Scale (GONZALO)    Score Comments   Normal Diet  [] 7 With no strategies or extra time needed   Functional Swallow  [x] 6 May have mild oral or pharyngeal delay   Mild Dysphagia  [] 5 Which may require one diet consistency restricted    Mild-Moderate Dysphagia  [] 4 With 1-2 diet consistencies restricted   Moderate Dysphagia  [] 3 With 2 or more diet consistencies restricted   Moderate-Severe Dysphagia  [] 2 With partial PO strategies (trials with ST only)   Severe Dysphagia  [] 1 With inability to tolerate any PO safely      Score:  Initial: 6 Most Recent: 6 (Date 01/06/20 )   Interpretation of Tool: The Dysphagia Outcome and Severity Scale (GONZALO) is a simple, easy-to-use, 7-point scale developed to systematically rate the functional severity of dysphagia based on objective assessment and make recommendations for diet level, independence level, and type of nutrition. Current Medications:   No current facility-administered medications on file prior to encounter. Current Outpatient Medications on File Prior to Encounter   Medication Sig Dispense Refill    losartan (COZAAR) 100 mg tablet Take 100 mg by mouth.       amLODIPine (NORVASC) 5 mg tablet 1 every day for BP 90 Tab 12    sildenafil citrate (VIAGRA) 100 mg tablet Take 1 Tab by mouth as needed (ed). 4 Tab 2         PLAN    FREQUENCY/DURATION: No further speech therapy indicated at this time as oropharyngeal swallow function is within normal limits. - Recommendations for next treatment session: No additional speech therapy indicated at this time. REHABILITATION POTENTIAL FOR STATED GOALS: Good     COMPLIANCE WITH PROGRAM/EXERCISES: Compliant most of the time    CONTINUATION OF SKILLED SERVICES/MEDICAL NECESSITY:  No further treatment indicated as patient tolerating current diet        RECOMMENDATIONS   DIET:    continue prescribed diet   PO:  Regular   Liquids:  regular thin    MEDICATIONS: With liquid     ASPIRATION PRECAUTIONS  · Slow rate of intake  · Small bites/sips  · Upright at 90 degrees during meal     EDUCATION:  · Recommendations discussed with Nursing  · Patient     RECOMMENDATIONS for CONTINUED SPEECH THERAPY:   No further speech therapy indicated at this time.   Please reconsult if new concerns arise       SAFETY:  After treatment position/precautions:  · Upright in bed  · RN notified  · Call light within reach    Total Treatment Duration:   Time In: 8212  Time Out: [address], [facility] [address] [address] [facility]

## 2020-01-06 NOTE — PROGRESS NOTES
01/06/20 0721   NIH Stroke Scale   Interval Other (comment)  (Dual NIH with Armando Schneider, TUNG )   LOC 0   LOC Questions 0   LOC Commands 0   Best Gaze 0   Visual 0   Facial Palsy 0   Motor Right Arm 0   Motor Left Arm 0   Motor Right Leg 0   Motor Left Leg 0   Limb Ataxia 0   Sensory 0   Best Language 0   Dysarthria 0   Extinction and Inattention 0   Total 0

## 2020-01-06 NOTE — PROGRESS NOTES
Problem: Falls - Risk of  Goal: *Absence of Falls  Description  Document Bianka Zamoranovince Fall Risk and appropriate interventions in the flowsheet.   Outcome: Progressing Towards Goal  Note: Fall Risk Interventions:  Mobility Interventions: Bed/chair exit alarm, Communicate number of staff needed for ambulation/transfer, OT consult for ADLs, Patient to call before getting OOB, PT Consult for mobility concerns, PT Consult for assist device competence, Strengthening exercises (ROM-active/passive), Utilize walker, cane, or other assistive device         Medication Interventions: Assess postural VS orthostatic hypotension, Bed/chair exit alarm, Patient to call before getting OOB, Teach patient to arise slowly    Elimination Interventions: Bed/chair exit alarm, Call light in reach, Stay With Me (per policy), Patient to call for help with toileting needs, Toileting schedule/hourly rounds, Toilet paper/wipes in reach              Problem: Patient Education: Go to Patient Education Activity  Goal: Patient/Family Education  Outcome: Progressing Towards Goal

## 2020-01-07 PROCEDURE — 65270000029 HC RM PRIVATE

## 2020-01-07 PROCEDURE — 74011250637 HC RX REV CODE- 250/637: Performed by: UROLOGY

## 2020-01-07 PROCEDURE — 97530 THERAPEUTIC ACTIVITIES: CPT

## 2020-01-07 RX ORDER — ADHESIVE BANDAGE
30 BANDAGE TOPICAL ONCE
Status: COMPLETED | OUTPATIENT
Start: 2020-01-07 | End: 2020-01-07

## 2020-01-07 RX ADMIN — DUTASTERIDE 0.5 MG: 0.5 CAPSULE, LIQUID FILLED ORAL at 08:24

## 2020-01-07 RX ADMIN — Medication 10 ML: at 23:17

## 2020-01-07 RX ADMIN — DOCUSATE SODIUM 100 MG: 100 CAPSULE, LIQUID FILLED ORAL at 18:29

## 2020-01-07 RX ADMIN — Medication 10 ML: at 05:00

## 2020-01-07 RX ADMIN — HYDROCODONE BITARTRATE AND ACETAMINOPHEN 1 TABLET: 5; 325 TABLET ORAL at 18:31

## 2020-01-07 RX ADMIN — Medication 5 ML: at 14:32

## 2020-01-07 RX ADMIN — MAGNESIUM HYDROXIDE 30 ML: 400 SUSPENSION ORAL at 09:16

## 2020-01-07 RX ADMIN — DOCUSATE SODIUM 100 MG: 100 CAPSULE, LIQUID FILLED ORAL at 08:24

## 2020-01-07 NOTE — PROGRESS NOTES
Problem: Mobility Impaired (Adult and Pediatric)  Goal: *Acute Goals and Plan of Care (Insert Text)  Description  LTG:  (1.)Mr. Brittny Hoffmann will move from supine to sit and sit to supine , scoot up and down and roll side to side with INDEPENDENT within 7 treatment day(s) through log rolling.    (2.)Mr. Brittny Hoffmann will transfer from bed to chair and chair to bed with SUPERVISION using the least restrictive device within 7 treatment day(s). (3.)Mr. Brittny Hoffmann will ambulate with SUPERVISION for 500+ feet with the least restrictive device within 7 treatment day(s) while maintaining normal vital signs. (4.)Mr. Brittny Hoffmann will perform 2 steps with CGA for safety ascending and descending stairs for home within 7 treatment days. _____________________________________________________________________________________________   Outcome: Progressing Towards Goal     PHYSICAL THERAPY: Daily Note and AM 1/7/2020  INPATIENT: PT Visit Days : 3  Payor: Minnie Garcia / Plan: [id number] PPO/PFFS / Product Type: [employer] Medicare /       NAME/AGE/GENDER: Janie Arnold is a 78 y.o. male   PRIMARY DIAGNOSIS: Benign prostatic hyperplasia, unspecified whether lower urinary tract symptoms present [N40.0]  Bladder stone [N21.0]  Bladder stone [N21.0]  BPH with obstruction/lower urinary tract symptoms [N40.1, N13.8] <principal problem not specified> <principal problem not specified>  Procedure(s) (LRB):  OPEN SIMPLE PROSTATECTOMY (N/A)  WITH BLADDER STONE REMOVAL (N/A)  4 Days Post-Op  ICD-10: Treatment Diagnosis:    · Difficulty in walking, Not elsewhere classified (R26.2)   Precaution/Allergies:  Cefdinir and Sulfa (sulfonamide antibiotics)      ASSESSMENT:     Mr. Brittny Hoffmann presents with decreased bed mobility, transfers, ambulation, balance, activity tolerance and overall general functional mobility s/p hospital admission with above surgery on 1/3/20.  Pt with code S called 1/3 after surgery for slurred speech, aphasia, CVA workup negative for stoke. Pt spouse and daughter present during assessment. Family reports pt back at baseline for speech and mentation. Pt reports lives in 2 story home on main level, independent at baseline for ambulation, ADLs, drives. Pt states no falls at home. Pt is supine on contact. With a little encouragement he agreed to participate. He rolled to R side and sat to EOB. He stood and ambulated with RW into hallway for 250' with SBA, slowly and guardedly. He sat in chair once back in his room. Patient performed therapeutic strengthening exercises to B LE as listed below to improve endurance, balance and functional strength for transfers, gait and overall mobility. Patient required cues to perform exercises correctly. He was left with wife present and needs in reach. PT to cont to follow for acute care needs. This section established at most recent assessment   PROBLEM LIST (Impairments causing functional limitations):  1. Decreased ADL/Functional Activities  2. Decreased Transfer Abilities  3. Decreased Ambulation Ability/Technique  4. Decreased Balance  5. Increased Pain  6. Decreased Activity Tolerance   INTERVENTIONS PLANNED: (Benefits and precautions of physical therapy have been discussed with the patient.)  1. Balance Exercise  2. Bed Mobility  3. Family Education  4. Gait Training  5. Home Exercise Program (HEP)  6. Therapeutic Activites  7. Therapeutic Exercise/Strengthening  8. Transfer Training     TREATMENT PLAN: Frequency/Duration: 3 times a week for duration of hospital stay  Rehabilitation Potential For Stated Goals: Good     REHAB RECOMMENDATIONS (at time of discharge pending progress):    Placement: It is my opinion, based on this patient's performance to date, that Mr. Alfonso Wolf may benefit from [address] after discharge due to the functional deficits listed above that are likely to improve with skilled rehabilitation because he/she has an inability to tolerate transportation to an outpatient setting as evidenced by fatigue and because low endurance. .   Equipment:    Walkers, Type: Rolling Walker  May try to borrow one              HISTORY:   History of Present Injury/Illness (Reason for Referral):  Jenifer Ferreira is a 78 y.o. male followed secondary to an elevated PSA, BPH, and + family history of prostate cancer (father and identical twin brother). He was previously followed by Dr. Elvia Brizuela. He is taking avodart since 6/09. He underwent prostate biopsy 5/02 secondary to psa of 3.4. 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 Repeat prostate biopsy 8/02 revealed all benign cores. LUTS have improved on avodart. In regards to ED, he uses 100mg viagra with good results. He had an episode of gross hematuria. 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 CT 5/23/14 revealed pelvic mass to be a VERY LARGE prostate (180 gr by estimation) with indentation into the bladder. Cystoscopy 5/14 revealed the enlarged prostate and no bladder pathology. He returned in 6/17 with 6 month history of gross hematuria 2-3 times per week. It could be heavy at times. LUTS are well controlled on his avodart. He stopped 81 mg asa in 6/17 and gross hematuria has decreased substantially. We discussed repeating cystoscopy and he wanted to hold off for now since we have done that as recently as '15. He understands a very low risk of missing a bladder cancer, although bleeding is much more likely from a prostatic source. He is continuing to have gross hematuria, light, with exercise. He has also passed a few stones and had 2 UTI's in the last year.     Past Medical History/Comorbidities:   Mr. Michelle Hall  has a past medical history of Arthritis, BPH (benign prostatic hyperplasia), Calculus of ureter, Elevated prostate specific antigen (PSA), Family history of malignant neoplasm of prostate, First degree AV block, Hematospermia, Hematuria, microscopic, Hypertension, Hypertrophy of prostate without urinary obstruction and other lower urinary tract symptoms (LUTS), Ill-defined condition, Impotence of organic origin, Incomplete right bundle branch block, and Renal colic. Mr. Timbo Encarnacion  has a past surgical history that includes biopsy prostate; hx colonoscopy (2016); and hx hernia repair (AS CHILD). Social History/Living Environment:   Home Environment: Private residence  One/Two Story Residence: Two story  Living Alone: No  Support Systems: Spouse/Significant Other/Partner  Patient Expects to be Discharged to[de-identified] Private residence  Current DME Used/Available at Home: Shower chair  Tub or Shower Type: Shower  Prior Level of Function/Work/Activity:  Lives with spouse, independent at baseline, drives  Personal Factors:          Sex:  male        Age:  78 y.o. Overall Behavior:  agreeable   Number of Personal Factors/Comorbidities that affect the Plan of Care:  Age, HTN, TIA? 3+: HIGH COMPLEXITY   EXAMINATION:   Most Recent Physical Functioning:   Gross Assessment:                  Posture:     Balance:  Sitting: Intact  Standing - Static: Good  Standing - Dynamic : Fair Bed Mobility:  Rolling: Contact guard assistance  Supine to Sit: Stand-by assistance  Wheelchair Mobility:     Transfers:  Sit to Stand: Stand-by assistance  Stand to Sit: Stand-by assistance  Gait:     Base of Support: Widened  Speed/Lori: Shuffled; Slow  Step Length: Left shortened;Right shortened  Swing Pattern: Left symmetrical;Right symmetrical  Distance (ft): 250 Feet (ft)  Assistive Device: Walker, rolling  Ambulation - Level of Assistance: Stand-by assistance  Interventions: Safety awareness training;Verbal cues      Body Structures Involved:  1. Muscles Body Functions Affected:  1. Movement Related Activities and Participation Affected:  1. General Tasks and Demands  2. Mobility  3.  Self Care   Number of elements that affect the Plan of Care: 4+: HIGH COMPLEXITY   CLINICAL PRESENTATION:   Presentation: Evolving clinical presentation with changing clinical characteristics: MODERATE COMPLEXITY   CLINICAL DECISION MAKIN [facility] Mobility Inpatient Short Form  How much difficulty does the patient currently have. .. Unable A Lot A Little None   1. Turning over in bed (including adjusting bedclothes, sheets and blankets)? [] 1   [] 2   [x] 3   [] 4   2. Sitting down on and standing up from a chair with arms ( e.g., wheelchair, bedside commode, etc.)   [] 1   [] 2   [x] 3   [] 4   3. Moving from lying on back to sitting on the side of the bed? [] 1   [] 2   [x] 3   [] 4   How much help from another person does the patient currently need. .. Total A Lot A Little None   4. Moving to and from a bed to a chair (including a wheelchair)? [] 1   [] 2   [x] 3   [] 4   5. Need to walk in hospital room? [] 1   [] 2   [x] 3   [] 4   6. Climbing 3-5 steps with a railing? [] 1   [] 2   [x] 3   [] 4   © , Trustees of [facility] [employer], under license to [employer]. All rights reserved      Score:  Initial: 18 Most Recent: X (Date: -- )    Interpretation of Tool:  Represents activities that are increasingly more difficult (i.e. Bed mobility, Transfers, Gait). Medical Necessity:     · Patient is expected to demonstrate progress in   · strength, range of motion, balance, and coordination  ·  to   · decrease assistance required with overall functional mobility, transfers, and ambulation   · .  · Patient demonstrates   · good  ·  rehab potential due to higher previous functional level. Reason for Services/Other Comments:  · Patient   · continues to require present interventions due to patient's inability to perform bed mobility, transfers, ambulation safely and effectively   · .    Use of outcome tool(s) and clinical judgement create a POC that gives a: Clear prediction of patient's progress: LOW COMPLEXITY            TREATMENT:   (In addition to Assessment/Re-Assessment sessions the following treatments were rendered)   Pre-treatment Symptoms/Complaints: \"Its soreness. \"  Pain: Initial:      Post Session:     Therapeutic Activity: (    23 min): Therapeutic activities including Bed transfers, Chair transfers, Ambulation on level ground, and walker safety, posture to improve mobility, strength, balance, and coordination. Required minimal Safety awareness training;Verbal cues to promote static and dynamic balance in standing and promote coordination of bilateral, lower extremity(s). Braces/Orthotics/Lines/Etc:   · ryan catheter  · O2 Device: Room air  Treatment/Session Assessment:    · Response to Treatment:  See above  · Interdisciplinary Collaboration:   o Physical Therapy Assistant  o Registered Nurse  · After treatment position/precautions:   o Up in chair  o Bed/Chair-wheels locked  o Bed in low position  o Call light within reach  o RN notified  o Family at bedside   · Compliance with Program/Exercises: Will assess as treatment progresses  · Recommendations/Intent for next treatment session: \"Next visit will focus on advancements to more challenging activities\".   Total Treatment Duration:  PT Patient Time In/Time Out  Time In: 1033  Time Out: 100 [facility]

## 2020-01-07 NOTE — PROGRESS NOTES
Admit Date: 1/3/2020    Subjective:     Patient has no new complaint. Passed flatus. Tolerating regular diet. Objective:     Patient Vitals for the past 8 hrs:   BP Temp Pulse Resp SpO2   01/07/20 0400 148/78 98.2 °F (36.8 °C) 63 18 95 %   01/07/20 0000 126/71 98.3 °F (36.8 °C) 64 18 93 %     No intake/output data recorded. 01/05 1901 - 01/07 0700  In: 21685 [P.O.:360]  Out: 40453     Physical Exam: rrr, ctab, abd soft, urine clear on slow cbi        Data Review   Recent Results (from the past 24 hour(s))   CBC W/O DIFF    Collection Time: 01/06/20  8:39 AM   Result Value Ref Range    WBC 6.1 4.3 - 11.1 K/uL    RBC 3.04 (L) 4.23 - 5.6 M/uL    HGB 9.6 (L) 13.6 - 17.2 g/dL    HCT 27.9 (L) 41.1 - 50.3 %    MCV 91.8 79.6 - 97.8 FL    MCH 31.6 26.1 - 32.9 PG    MCHC 34.4 31.4 - 35.0 g/dL    RDW 13.9 11.9 - 14.6 %    PLATELET 015 (L) 672 - 450 K/uL    MPV 8.9 (L) 9.4 - 12.3 FL    ABSOLUTE NRBC 0.00 0.0 - 0.2 K/uL           Assessment:     Active Problems:    Bladder stone (1/3/2020)      BPH with obstruction/lower urinary tract symptoms (1/3/2020)    pod 4 s/p open bladder stone extraction and simple prosatectomy    Plan:       Stop cbi. Leave ryan IN. Ambulate. Pathology report reviewed with patient and copy given.     Javier Bray MD

## 2020-01-07 NOTE — PROGRESS NOTES
01/07/20 0715   NIH Stroke Scale   Interval Other (comment)  (Neuro Checks)   LOC 0   LOC Questions 0   LOC Commands 0   Best Gaze 0   Visual 0   Facial Palsy 0   Motor Right Arm 0   Motor Left Arm 0   Motor Right Leg 0   Motor Left Leg 0   Limb Ataxia 0   Sensory 0   Best Language 0   Dysarthria 0   Extinction and Inattention 0   Total 0     Dual NIH with Christy RUBY

## 2020-01-07 NOTE — INTERDISCIPLINARY ROUNDS
Interdisciplinary team rounds were held 1/7/2020 with the following team members:  Care Management, Physical Therapy and . Plan of care discussed. See clinical pathway and/or care plan for interventions and desired outcomes. Anticipate discharge home tomorrow.

## 2020-01-07 NOTE — PROGRESS NOTES
01/07/20 0336   NIH Stroke Scale   Interval Other (comment)  (Neuro checks)   LOC 0   LOC Questions 0   LOC Commands 0   Motor Right Arm 0   Motor Left Arm 0   Motor Right Leg 0   Motor Left Leg 0   Dysarthria 0     Neuro checks

## 2020-01-07 NOTE — PROGRESS NOTES
CM met with patient and wife at bedside. Patient is alert and laying in bed. PT recommending home health PT. List of Medicare certified facilities given to patient to review with wife. Patient and wife live in a two story home, but patient does not have to access the second story. Prior to this admission patient was completely independent in ADLs, drove and has no DME at home. CM will follow up with patient tomorrow after he and wife review list of services. Care Management Interventions  PCP Verified by CM: Yes  Transition of Care Consult (CM Consult): Discharge Planning, Home Health  Physical Therapy Consult: Yes  Occupational Therapy Consult: Yes  Speech Therapy Consult: Yes  Current Support Network: Lives with Spouse, Own Home  Confirm Follow Up Transport: Family  The Plan for Transition of Care is Related to the Following Treatment Goals : Patient will participate in home health PT in order to return to baseline independence in all ADLs.   The Patient and/or Patient Representative was Provided with a Choice of Provider and Agrees with the Discharge Plan?: Yes  Name of the Patient Representative Who was Provided with a Choice of Provider and Agrees with the Discharge Plan: Adonis Vera  Freedom of Choice List was Provided with Basic Dialogue that Supports the Patient's Individualized Plan of Care/Goals, Treatment Preferences and Shares the Quality Data Associated with the Providers?: Yes  Discharge Location  Discharge Placement: Home with home health

## 2020-01-07 NOTE — PROGRESS NOTES
01/06/20 2337   NIH Stroke Scale   Interval Other (comment)  (Neuro Checks)   LOC 0   LOC Questions 0   LOC Commands 0   Motor Right Arm 0   Motor Left Arm 0   Motor Right Leg 0   Motor Left Leg 0   Dysarthria 0     Neuro checks

## 2020-01-07 NOTE — PROGRESS NOTES
MD notified of thick, blood- like fluid around cathter and bright red blood draining from cathter. No new orders.

## 2020-01-07 NOTE — PROGRESS NOTES
01/06/20 2001   NIH Stroke Scale   Interval Other (comment)  (SHift change with TUNG Umaña)   LOC 0   LOC Questions 0   LOC Commands 0   Best Gaze 0   Visual 0   Facial Palsy 0   Motor Right Arm 0   Motor Left Arm 0   Motor Right Leg 0   Motor Left Leg 0   Limb Ataxia 0   Sensory 0   Best Language 0   Dysarthria 0   Extinction and Inattention 0   Total 0

## 2020-01-08 ENCOUNTER — HOME HEALTH ADMISSION (OUTPATIENT)
Dept: HOME HEALTH SERVICES | Facility: HOME HEALTH | Age: 80
End: 2020-01-08

## 2020-01-08 VITALS
HEART RATE: 72 BPM | OXYGEN SATURATION: 93 % | RESPIRATION RATE: 14 BRPM | SYSTOLIC BLOOD PRESSURE: 144 MMHG | WEIGHT: 186 LBS | HEIGHT: 69 IN | TEMPERATURE: 98.3 F | BODY MASS INDEX: 27.55 KG/M2 | DIASTOLIC BLOOD PRESSURE: 83 MMHG

## 2020-01-08 PROCEDURE — 74011250637 HC RX REV CODE- 250/637: Performed by: UROLOGY

## 2020-01-08 RX ORDER — HYDROCODONE BITARTRATE AND ACETAMINOPHEN 5; 325 MG/1; MG/1
1 TABLET ORAL
Qty: 12 TAB | Refills: 0 | Status: SHIPPED | OUTPATIENT
Start: 2020-01-08 | End: 2020-01-11

## 2020-01-08 RX ADMIN — HYDROCODONE BITARTRATE AND ACETAMINOPHEN 1 TABLET: 5; 325 TABLET ORAL at 03:08

## 2020-01-08 RX ADMIN — DUTASTERIDE 0.5 MG: 0.5 CAPSULE, LIQUID FILLED ORAL at 08:12

## 2020-01-08 RX ADMIN — Medication 10 ML: at 03:14

## 2020-01-08 RX ADMIN — DOCUSATE SODIUM 100 MG: 100 CAPSULE, LIQUID FILLED ORAL at 08:12

## 2020-01-08 NOTE — PROGRESS NOTES
Problem: Falls - Risk of  Goal: *Absence of Falls  Description  Document Florentin Pretty Fall Risk and appropriate interventions in the flowsheet.   Outcome: Progressing Towards Goal  Note: Fall Risk Interventions:  Mobility Interventions: Bed/chair exit alarm, Communicate number of staff needed for ambulation/transfer, OT consult for ADLs, Patient to call before getting OOB, PT Consult for mobility concerns, PT Consult for assist device competence, Strengthening exercises (ROM-active/passive), Utilize walker, cane, or other assistive device         Medication Interventions: Assess postural VS orthostatic hypotension, Bed/chair exit alarm, Patient to call before getting OOB, Teach patient to arise slowly    Elimination Interventions: Bed/chair exit alarm, Call light in reach, Patient to call for help with toileting needs, Stay With Me (per policy), Toilet paper/wipes in reach, Toileting schedule/hourly rounds              Problem: Patient Education: Go to Patient Education Activity  Goal: Patient/Family Education  Outcome: Progressing Towards Goal

## 2020-01-08 NOTE — DISCHARGE SUMMARY
Discharge Summary     Patient: Jodee Christensen MRN: [id number]  SSN: [id number]    YOB: 1940  Age: 78 y.o. Sex: male      Allergies: Cefdinir and Sulfa (sulfonamide antibiotics)    Admit Date: 1/3/2020    Discharge Date: 1/8/2020     * Admission Diagnoses:  Benign prostatic hyperplasia, unspecified whether lower urinary tract symptoms present [N40.0]  Bladder stone [N21.0]  Bladder stone [N21.0]  BPH with obstruction/lower urinary tract symptoms [N40.1, N13.8]     * Discharge Diagnoses:   Hospital Problems as of 1/8/2020 Date Reviewed: 12/27/2019          Codes Class Noted - Resolved POA    Bladder stone ICD-10-CM: N21.0  ICD-9-CM: 594.1  1/3/2020 - Present Unknown        BPH with obstruction/lower urinary tract symptoms ICD-10-CM: N40.1, N13.8  ICD-9-CM: 600.01, 599.69  1/3/2020 - Present Unknown               * Procedures for this admission:   Procedure(s):  OPEN SIMPLE PROSTATECTOMY  WITH BLADDER STONE REMOVAL      * Disposition: Home    * Discharged Condition: improved    * Hospital Course:    Mr. Lilia Nielsen is a 40-year-old male with hx of BPH, LUTS and bladder stone who is s/p open bladder stone extraction and simple prostatectomy on 1/3/2020 by Dr. Ivanna Mauricio. Post-operatively, he became aphasic with slurred speech and code S was called where Dr. Belkys Barney evaluated him, CT head showed no evidence of hemorrhage or infarction or thrombus, he was found to have TIA, as symptoms quickly resolved. His urine eventually cleared with CBI. CBI stopped on POD 4. Urine has remained clear x 24 + hours off CBI. He is passing flatus, tolerating solid foods and having BMs. He is feeling well. He will d/c home today and RTO next Monday for f/u visit with Dr. Ivanna Mauricio and ryan catheter removal.  RN to teach ryan care. He will f/u with Dr. Belkys Barney in clinic.       Patient Active Problem List   Diagnosis Code    Dermatitis L30.9    Impotence of organic origin N52.9    Essential hypertension I10  Pulmonary nodule, right R91.1    Benign prostatic hyperplasia N40.0    Skin cancer, basal cell C44.91    Bladder stone N21.0    BPH with obstruction/lower urinary tract symptoms N40.1, N13.8                 Discharge Medications:   Current Discharge Medication List      START taking these medications    Details   HYDROcodone-acetaminophen (NORCO) 5-325 mg per tablet Take 1 Tab by mouth every four (4) hours as needed for Pain for up to 3 days. Max Daily Amount: 6 Tabs. Qty: 12 Tab, Refills: 0    Associated Diagnoses: S/P urological surgery         CONTINUE these medications which have NOT CHANGED    Details   multivitamin (ONE A DAY) tablet Take 1 Tab by mouth daily. dutasteride (AVODART) 0.5 mg capsule TAKE 1 CAPSULE BY MOUTH  DAILY  Qty: 90 Cap, Refills: 3      losartan (COZAAR) 100 mg tablet Take 100 mg by mouth. amLODIPine (NORVASC) 5 mg tablet 1 every day for BP  Qty: 90 Tab, Refills: 12    Associated Diagnoses: Essential hypertension      tamsulosin (FLOMAX) 0.4 mg capsule Take 0.4 mg by mouth nightly. sildenafil citrate (VIAGRA) 100 mg tablet Take 1 Tab by mouth as needed (ed). Qty: 4 Tab, Refills: 2    Associated Diagnoses: Erectile dysfunction, unspecified erectile dysfunction type              * Follow-up Care/Patient Instructions: Activity: no heavy lifting, pushing, pulling, avoid straining    Diet: Regular Diet  Wound Care: None needed    Follow-up Information     Follow up With Specialties Details Why [address] from [facility] will call you within 48 hours of discharge. [address] [facility] 149    Tip Alaniz MD [place] [id number] [facility],[address]Th Floor, [facility] [address] [place] [address] Rd  [phone]             I have reviewed the above discharge summary and agree with the information within. Kenya Kelly.  Ashley Song MD

## 2020-01-08 NOTE — PROGRESS NOTES
Patient and wife have reviewed list of medicare certified home health agencies and have chosen [employer] services. Consult, referral and order have been placed. Care Management Interventions  PCP Verified by CM: Yes  Transition of Care Consult (CM Consult): Home Health, Discharge Bryon oCe [phone] [address]: Yes  Physical Therapy Consult: Yes  Occupational Therapy Consult: Yes  Speech Therapy Consult: Yes  Current Support Network: Lives with Spouse, Own Home  Confirm Follow Up Transport: Family  The Plan for Transition of Care is Related to the Following Treatment Goals : Patient will participate in home health PT in order to gain and maintain independence in all ADLs.    The Patient and/or Patient Representative was Provided with a Choice of Provider and Agrees with the Discharge Plan?: Yes  Name of the Patient Representative Who was Provided with a Choice of Provider and Agrees with the Discharge Plan: Sharren Hashimoto  [place] of Choice List was Provided with Basic Dialogue that Supports the Patient's Individualized Plan of Care/Goals, Treatment Preferences and Shares the Quality Data Associated with the Providers?: Yes  Discharge Location  Discharge Placement: Home with home health

## 2020-01-08 NOTE — PROGRESS NOTES
Nursing skill added to home health as patient will be going home with ryan catheter. Face to face updated.

## 2020-01-08 NOTE — PROGRESS NOTES
01/08/20 0018   NIH Stroke Scale   Interval Other (comment)  (Neuro checks)   LOC 0   LOC Questions 0   LOC Commands 0   Motor Right Arm 0   Motor Left Arm 0   Motor Right Leg 0   Motor Left Leg 0   Dysarthria 0     Neuro checks

## 2020-01-08 NOTE — PROGRESS NOTES
01/08/20 0707   NIH Stroke Scale   Interval Other (comment)  (Dual NIH with Beth Arguello RN)   LOC 0   LOC Questions 0   LOC Commands 0   Best Gaze 0   Visual 0   Facial Palsy 0   Motor Right Arm 0   Motor Left Arm 0   Motor Right Leg 0   Motor Left Leg 0   Limb Ataxia 0   Sensory 0   Best Language 0   Dysarthria 0   Extinction and Inattention 0   Total 0     Dual NIH with Christy RUBY

## 2020-01-08 NOTE — PROGRESS NOTES
[facility] [place] - INPATIENT  Face to Face Encounter    Patients Name: Rob Batista    YOB: 1940    Ordering Physician: Dr. Les Thornton    Primary Diagnosis: Benign prostatic hyperplasia, unspecified whether lower urinary tract symptoms present [N40.0]  Bladder stone [N21.0]  Bladder stone [N21.0]  BPH with obstruction/lower urinary tract symptoms [N40.1, N13.8]    Date of Face to Face:   1/8/2020                                  Face to Face Encounter findings are related to primary reason for home care:   yes. 1. I certify that the patient needs intermittent care as follows: physical therapy: strengthening, stretching/ROM, transfer training, gait/stair training, balance training and pt/caregiver education and nursing for ryan catheter education and care. 2. I certify that this patient is homebound, that is: 1) patient requires the use of a walker device, special transportation, or assistance of another to leave the home; or 2) patient's condition makes leaving the home medically contraindicated; and 3) patient has a normal inability to leave the home and leaving the home requires considerable and taxing effort. Patient may leave the home for infrequent and short duration for medical reasons, and occasional absences for non-medical reasons. Homebound status is due to the following functional limitations: Patient with strength deficits limiting the performance of all ADL's without caregiver assistance or the use of an assistive device. 3. I certify that this patient is under my care and that I, or a nurse practitioner or  [id number], or clinical nurse specialist, or certified nurse midwife, working with me, had a Face-to-Face Encounter that meets the physician Face-to-Face Encounter requirements.   The following are the clinical findings from the [address] [place], [place] [address]e [place] encounter that support the need for skilled services and is a summary of the encounter:     See chart      Maribel Salazar, RN  1/8/2020      THE FOLLOWING TO BE COMPLETED BY THE COMMUNITY PHYSICIAN:    I concur with the findings described above from the F2F encounter that this patient is homebound and in need of a skilled service.     Certifying Physician: _____________________________________      Printed Certifying Physician Name: _____________________________________    Date: _________________

## 2020-01-08 NOTE — DISCHARGE INSTRUCTIONS
DISCHARGE SUMMARY from Nurse    PATIENT INSTRUCTIONS:    After general anesthesia or intravenous sedation, for 24 hours or while taking prescription Narcotics:  · Limit your activities  · Do not drive and operate hazardous machinery  · Do not make important personal or business decisions  · Do  not drink alcoholic beverages  · If you have not urinated within 8 hours after discharge, please contact your surgeon on call. Report the following to your surgeon:  · Excessive pain, swelling, redness or odor of or around the surgical area  · Temperature over 100.5  · Nausea and vomiting lasting longer than 4 hours or if unable to take medications  · Any signs of decreased circulation or nerve impairment to extremity: change in color, persistent  numbness, tingling, coldness or increase pain  · Any questions    What to do at Home:  Recommended activity: Activity as tolerated, no heavy lifting, pushing, pulling, avoid straining. Leg bag during the day bedside bag at night    If you experience any of the following symptoms fever, pain, nausea or vomiting, complications with catheter or any other worrisome symptoms, please follow up with Dr. Les Thornton. *  Please give a list of your current medications to your Primary Care Provider. *  Please update this list whenever your medications are discontinued, doses are      changed, or new medications (including over-the-counter products) are added. *  Please carry medication information at all times in case of emergency situations. These are general instructions for a healthy lifestyle:    No smoking/ No tobacco products/ Avoid exposure to second hand smoke  Surgeon General's Warning:  Quitting smoking now greatly reduces serious risk to your health.     Obesity, smoking, and sedentary lifestyle greatly increases your risk for illness    A healthy diet, regular physical exercise & weight monitoring are important for maintaining a healthy lifestyle    You may be retaining fluid if you have a history of heart failure or if you experience any of the following symptoms:  Weight gain of 3 pounds or more overnight or 5 pounds in a week, increased swelling in our hands or feet or shortness of breath while lying flat in bed. Please call your doctor as soon as you notice any of these symptoms; do not wait until your next office visit. The discharge information has been reviewed with the patient. The patient verbalized understanding. Discharge medications reviewed with the patient and appropriate educational materials and side effects teaching were provided.   ___________________________________________________________________________________________________________________________________

## 2020-01-08 NOTE — PROGRESS NOTES
01/08/20 0313   NIH Stroke Scale   Interval Other (comment)  (Neuro Checks)   LOC 0   LOC Questions 0   LOC Commands 0   Motor Right Arm 0   Motor Left Arm 0   Motor Right Leg 0   Motor Left Leg 0   Dysarthria 0     Neuro checks

## 2020-01-08 NOTE — PROGRESS NOTES
01/08/20 1333   NIH Stroke Scale   Interval Other (comment)  (Discharge)   LOC 0   LOC Questions 0   LOC Commands 0   Best Gaze 0   Visual 0   Facial Palsy 0   Motor Right Arm 0   Motor Left Arm 0   Motor Right Leg 0   Motor Left Leg 0   Limb Ataxia 0   Sensory 0   Best Language 0   Dysarthria 0   Extinction and Inattention 0   Total 0

## 2020-01-08 NOTE — PROGRESS NOTES
01/07/20 1941   NIH Stroke Scale   Interval Other (comment)  (Shift change with Kris Hess RN)   LOC 0   LOC Questions 0   LOC Commands 0   Best Gaze 0   Visual 0   Facial Palsy 0   Motor Right Arm 0   Motor Left Arm 0   Motor Right Leg 0   Motor Left Leg 0   Limb Ataxia 0   Sensory 0   Best Language 0   Dysarthria 0   Extinction and Inattention 0   Total 0

## 2020-01-08 NOTE — PROGRESS NOTES
Admit Date: 1/3/2020    Subjective:     Sabrina Ocampo is doing well this morning. Urine has remained clear with CBI off x 24 hours. He is tolerating food, passing flatus and has had a BM. Objective:     Patient Vitals for the past 8 hrs:   BP Temp Pulse Resp SpO2   01/08/20 0400 155/82 98.4 °F (36.9 °C) 63 18 93 %     No intake/output data recorded. 01/06 1901 - 01/08 0700  In: 43619 [P.O.:240]  Out: 55085 [Urine:1425]    Physical Exam:  GENERAL: alert, cooperative, no distress  LUNG: clear to auscultation bilaterally  HEART: regular rate and rhythm, S1, S2   ABDOMEN: soft, non-tender, active BS, +flatus  NEUROLOGIC: AOx3    Data Review No results found for this or any previous visit (from the past 24 hour(s)). Assessment:     Active Problems:    Bladder stone (1/3/2020)      BPH with obstruction/lower urinary tract symptoms (1/3/2020)      POD 5 s/p open bladder stone extraction and simple prosatectomy     Afebrile, VSS    Plan:     If pt continues to do well, he will d/c home around noon WITH ryan catheter. Teach ryan care. He will RTO Monday, 1/13/20, for f/u visit with Dr. Lina Henao and ryan catheter removal.  Will notify him of appt time/date. Darshana Norton NP  [facility]    I have reviewed the above note and examined the patient. I agree with the exam, assessment and plan. Shannan Zimmer.  Lina Henao MD

## 2020-01-08 NOTE — PROGRESS NOTES
01/07/20 9989   NIH Stroke Scale   Interval Other (comment)  (Neuro checks)   LOC 0   LOC Questions 0   LOC Commands 0   Motor Right Arm 0   Motor Left Arm 0   Motor Right Leg 0   Motor Left Leg 0   Dysarthria 0     Neuro checks

## 2020-01-10 ENCOUNTER — HOME CARE VISIT (OUTPATIENT)
Dept: HOME HEALTH SERVICES | Facility: HOME HEALTH | Age: 80
End: 2020-01-10

## (undated) DEVICE — PACK SURGICAL PROCEDURE KIT CYSTOSCOPY TOTE

## (undated) DEVICE — SHEET, T, LAPAROTOMY, STERILE: Brand: MEDLINE

## (undated) DEVICE — SUTURE MCRYL SZ 2-0 L27IN ABSRB UD L26MM SH UR-6 1/2 CIR D8550

## (undated) DEVICE — GARMENT,MEDLINE,DVT,INT,CALF,MED, GEN2: Brand: MEDLINE

## (undated) DEVICE — REM POLYHESIVE ADULT PATIENT RETURN ELECTRODE: Brand: VALLEYLAB

## (undated) DEVICE — SYRINGE,TOOMEY,IRRIGATION,70CC,STERILE: Brand: MEDLINE

## (undated) DEVICE — INTENDED TO BE USED TO OCCLUDE, RETRACT AND IDENTIFY ARTERIES, VEINS, TENDONS AND NERVES IN SURGICAL PROCEDURES: Brand: STERION®  VESSEL LOOP

## (undated) DEVICE — SUTURE PDS II SZ 1 L96IN ABSRB VLT TP-1 L65MM 1/2 CIR Z880G

## (undated) DEVICE — SOLUTION IRRIG 1000ML H2O STRL BLT

## (undated) DEVICE — Device

## (undated) DEVICE — AGENT HEMSTAT W2XL14IN OXIDIZED REGENERATED CELOS ABSRB FOR

## (undated) DEVICE — SYRINGE CATH TIP 50ML

## (undated) DEVICE — SPONGE LAP 18X18IN STRL -- 5/PK

## (undated) DEVICE — SUTURE VCRL SZ 2-0 L27IN ABSRB UD L26MM SH 1/2 CIR J417H

## (undated) DEVICE — CATHETER URETH 20FR BLLN 5CC STD LTX HYDRGEL 2 W F BARDX

## (undated) DEVICE — CRADLE POS 3X5X24IN RASPBERRY ARM PRONE FOAM DISP

## (undated) DEVICE — 2000CC GUARDIAN II: Brand: GUARDIAN

## (undated) DEVICE — CARDINAL HEALTH FLEXIBLE LIGHT HANDLE COVER: Brand: CARDINAL HEALTH

## (undated) DEVICE — NEEDLE HYPO 21GA L1.5IN INTRAMUSCULAR S STL LATCH BVL UP

## (undated) DEVICE — MINOR SPLIT GENERAL: Brand: MEDLINE INDUSTRIES, INC.

## (undated) DEVICE — SPONGE: SPECIALTY PEANUT XR 100/CS: Brand: MEDICAL ACTION INDUSTRIES

## (undated) DEVICE — BLADE ELECTRODE: Brand: EDGE

## (undated) DEVICE — SUTURE VCRL SZ 2-0 L27IN ABSRB VLT L26MM UR-6 5/8 CIR J602H

## (undated) DEVICE — SYR 10ML LUER LOK 1/5ML GRAD --

## (undated) DEVICE — SHEARS ENDOSCP L23CM DIA5MM ULTRASONIC CRV TIP W/ ADV

## (undated) DEVICE — TRAY PREP DRY W/ PREM GLV 2 APPL 6 SPNG 2 UNDPD 1 OVERWRAP

## (undated) DEVICE — CYSTO/BLADDER IRRIGATION SET, REGULATING CLAMP

## (undated) DEVICE — CATH URETH FOL 2W MED 20FRX5 --

## (undated) DEVICE — SOLUTION IRRIG 3000ML 0.9% SOD CHL FLX CONT 0797208] ICU MEDICAL INC]

## (undated) DEVICE — SUTURE CHROMIC GUT SZ 2-0 L27IN ABSRB BRN L26MM SH 1/2 CIR G123H

## (undated) DEVICE — AMD ANTIMICROBIAL NON-ADHERENT PAD,0.2% POLYHEXAMETHYLENE BIGUANIDE HCI (PHMB): Brand: TELFA

## (undated) DEVICE — 3M™ TEGADERM™ TRANSPARENT FILM DRESSING FRAME STYLE, 1624W, 2-3/8 IN X 2-3/4 IN (6 CM X 7 CM), 100/CT 4CT/CASE: Brand: 3M™ TEGADERM™

## (undated) DEVICE — CATH URETH FOL 2W MED 20FRX30 -- CONVERT TO ITEM 363083

## (undated) DEVICE — BAG DRNGE 4000ML CONT IRRIG ROUNDED TEARDROP SHP DISP

## (undated) DEVICE — SUT CHRMC 4-0 27IN SH BRN --

## (undated) DEVICE — DEVICE STBL AD TRICOT ANCHR PD FOR 3 W F CATH STATLOK